# Patient Record
Sex: MALE | Race: BLACK OR AFRICAN AMERICAN | NOT HISPANIC OR LATINO
[De-identification: names, ages, dates, MRNs, and addresses within clinical notes are randomized per-mention and may not be internally consistent; named-entity substitution may affect disease eponyms.]

---

## 2018-09-21 ENCOUNTER — APPOINTMENT (OUTPATIENT)
Dept: ORTHOPEDIC SURGERY | Facility: CLINIC | Age: 63
End: 2018-09-21

## 2021-03-31 ENCOUNTER — APPOINTMENT (OUTPATIENT)
Dept: HEART AND VASCULAR | Facility: CLINIC | Age: 66
End: 2021-03-31
Payer: MEDICARE

## 2021-03-31 VITALS
OXYGEN SATURATION: 98 % | HEIGHT: 68 IN | BODY MASS INDEX: 25.46 KG/M2 | WEIGHT: 168 LBS | HEART RATE: 90 BPM | TEMPERATURE: 97.6 F

## 2021-03-31 VITALS — DIASTOLIC BLOOD PRESSURE: 90 MMHG | SYSTOLIC BLOOD PRESSURE: 170 MMHG

## 2021-03-31 DIAGNOSIS — R60.0 LOCALIZED EDEMA: ICD-10-CM

## 2021-03-31 DIAGNOSIS — K21.9 GASTRO-ESOPHAGEAL REFLUX DISEASE W/OUT ESOPHAGITIS: ICD-10-CM

## 2021-03-31 PROCEDURE — 99205 OFFICE O/P NEW HI 60 MIN: CPT

## 2021-03-31 PROCEDURE — 93000 ELECTROCARDIOGRAM COMPLETE: CPT

## 2021-03-31 PROCEDURE — 93306 TTE W/DOPPLER COMPLETE: CPT

## 2021-03-31 NOTE — PHYSICAL EXAM
[General Appearance - Well Developed] : well developed [Normal Appearance] : normal appearance [Well Groomed] : well groomed [General Appearance - Well Nourished] : well nourished [No Deformities] : no deformities [General Appearance - In No Acute Distress] : no acute distress [Normal Conjunctiva] : the conjunctiva exhibited no abnormalities [Eyelids - No Xanthelasma] : the eyelids demonstrated no xanthelasmas [Normal Oral Mucosa] : normal oral mucosa [No Oral Pallor] : no oral pallor [No Oral Cyanosis] : no oral cyanosis [Normal Jugular Venous A Waves Present] : normal jugular venous A waves present [Normal Jugular Venous V Waves Present] : normal jugular venous V waves present [No Jugular Venous Broussard A Waves] : no jugular venous broussard A waves [Heart Sounds] : normal S1 and S2 [Heart Rate And Rhythm] : heart rate and rhythm were normal [Murmurs] : no murmurs present [Respiration, Rhythm And Depth] : normal respiratory rhythm and effort [Exaggerated Use Of Accessory Muscles For Inspiration] : no accessory muscle use [Auscultation Breath Sounds / Voice Sounds] : lungs were clear to auscultation bilaterally [Abdomen Soft] : soft [Abdomen Tenderness] : non-tender [Abdomen Mass (___ Cm)] : no abdominal mass palpated [Abnormal Walk] : normal gait [Gait - Sufficient For Exercise Testing] : the gait was sufficient for exercise testing [Cyanosis, Localized] : no localized cyanosis [Nail Clubbing] : no clubbing of the fingernails [Petechial Hemorrhages (___cm)] : no petechial hemorrhages [Skin Color & Pigmentation] : normal skin color and pigmentation [] : no rash [No Venous Stasis] : no venous stasis [Skin Lesions] : no skin lesions [No Skin Ulcers] : no skin ulcer [No Xanthoma] : no  xanthoma was observed [Oriented To Time, Place, And Person] : oriented to person, place, and time [Affect] : the affect was normal [Mood] : the mood was normal [No Anxiety] : not feeling anxious

## 2021-03-31 NOTE — REVIEW OF SYSTEMS
[Shortness Of Breath] : shortness of breath [Chest Pain] : chest pain [Lower Ext Edema] : lower extremity edema [Negative] : Heme/Lymph

## 2021-04-01 ENCOUNTER — INPATIENT (INPATIENT)
Facility: HOSPITAL | Age: 66
LOS: 1 days | Discharge: HOME | DRG: 287 | End: 2021-04-03
Attending: INTERNAL MEDICINE | Admitting: INTERNAL MEDICINE
Payer: MEDICARE

## 2021-04-01 VITALS
SYSTOLIC BLOOD PRESSURE: 159 MMHG | HEART RATE: 83 BPM | WEIGHT: 167.99 LBS | RESPIRATION RATE: 20 BRPM | DIASTOLIC BLOOD PRESSURE: 97 MMHG | OXYGEN SATURATION: 97 % | TEMPERATURE: 98 F

## 2021-04-01 DIAGNOSIS — I50.21 ACUTE SYSTOLIC (CONGESTIVE) HEART FAILURE: ICD-10-CM

## 2021-04-01 DIAGNOSIS — K21.9 GASTRO-ESOPHAGEAL REFLUX DISEASE WITHOUT ESOPHAGITIS: ICD-10-CM

## 2021-04-01 DIAGNOSIS — I10 ESSENTIAL (PRIMARY) HYPERTENSION: ICD-10-CM

## 2021-04-01 DIAGNOSIS — R73.03 PREDIABETES: ICD-10-CM

## 2021-04-01 LAB
A1C WITH ESTIMATED AVERAGE GLUCOSE RESULT: 5.9 % — HIGH (ref 4–5.6)
BASOPHILS # BLD AUTO: 0.07 K/UL — SIGNIFICANT CHANGE UP (ref 0–0.2)
BASOPHILS NFR BLD AUTO: 1.4 % — SIGNIFICANT CHANGE UP (ref 0–2)
CK MB CFR SERPL CALC: 6.4 NG/ML — SIGNIFICANT CHANGE UP (ref 0–6.7)
CK MB CFR SERPL CALC: 7.4 NG/ML — HIGH (ref 0–6.7)
CK SERPL-CCNC: 184 U/L — SIGNIFICANT CHANGE UP (ref 30–200)
CK SERPL-CCNC: 217 U/L — HIGH (ref 30–200)
EOSINOPHIL # BLD AUTO: 0.06 K/UL — SIGNIFICANT CHANGE UP (ref 0–0.5)
EOSINOPHIL NFR BLD AUTO: 1.2 % — SIGNIFICANT CHANGE UP (ref 0–6)
ESTIMATED AVERAGE GLUCOSE: 123 MG/DL — HIGH (ref 68–114)
HCT VFR BLD CALC: 41 % — SIGNIFICANT CHANGE UP (ref 39–50)
HGB BLD-MCNC: 13.1 G/DL — SIGNIFICANT CHANGE UP (ref 13–17)
IMM GRANULOCYTES NFR BLD AUTO: 0.2 % — SIGNIFICANT CHANGE UP (ref 0–1.5)
LYMPHOCYTES # BLD AUTO: 1.63 K/UL — SIGNIFICANT CHANGE UP (ref 1–3.3)
LYMPHOCYTES # BLD AUTO: 31.8 % — SIGNIFICANT CHANGE UP (ref 13–44)
MCHC RBC-ENTMCNC: 26.7 PG — LOW (ref 27–34)
MCHC RBC-ENTMCNC: 32 GM/DL — SIGNIFICANT CHANGE UP (ref 32–36)
MCV RBC AUTO: 83.5 FL — SIGNIFICANT CHANGE UP (ref 80–100)
MONOCYTES # BLD AUTO: 0.72 K/UL — SIGNIFICANT CHANGE UP (ref 0–0.9)
MONOCYTES NFR BLD AUTO: 14 % — SIGNIFICANT CHANGE UP (ref 2–14)
NEUTROPHILS # BLD AUTO: 2.64 K/UL — SIGNIFICANT CHANGE UP (ref 1.8–7.4)
NEUTROPHILS NFR BLD AUTO: 51.4 % — SIGNIFICANT CHANGE UP (ref 43–77)
NRBC # BLD: 0 /100 WBCS — SIGNIFICANT CHANGE UP (ref 0–0)
PLATELET # BLD AUTO: 371 K/UL — SIGNIFICANT CHANGE UP (ref 150–400)
RBC # BLD: 4.91 M/UL — SIGNIFICANT CHANGE UP (ref 4.2–5.8)
RBC # FLD: 14.6 % — HIGH (ref 10.3–14.5)
TROPONIN T SERPL-MCNC: 0.02 NG/ML — HIGH (ref 0–0.01)
WBC # BLD: 5.13 K/UL — SIGNIFICANT CHANGE UP (ref 3.8–10.5)
WBC # FLD AUTO: 5.13 K/UL — SIGNIFICANT CHANGE UP (ref 3.8–10.5)

## 2021-04-01 PROCEDURE — 93010 ELECTROCARDIOGRAM REPORT: CPT

## 2021-04-01 PROCEDURE — 93460 R&L HRT ART/VENTRICLE ANGIO: CPT | Mod: 26

## 2021-04-01 PROCEDURE — 71045 X-RAY EXAM CHEST 1 VIEW: CPT | Mod: 26

## 2021-04-01 PROCEDURE — 93306 TTE W/DOPPLER COMPLETE: CPT | Mod: 26

## 2021-04-01 PROCEDURE — ZZZZZ: CPT

## 2021-04-01 PROCEDURE — 99285 EMERGENCY DEPT VISIT HI MDM: CPT | Mod: CS

## 2021-04-01 PROCEDURE — 99152 MOD SED SAME PHYS/QHP 5/>YRS: CPT

## 2021-04-01 RX ORDER — POTASSIUM CHLORIDE 20 MEQ
40 PACKET (EA) ORAL EVERY 4 HOURS
Refills: 0 | Status: COMPLETED | OUTPATIENT
Start: 2021-04-01 | End: 2021-04-01

## 2021-04-01 RX ORDER — ASPIRIN/CALCIUM CARB/MAGNESIUM 324 MG
81 TABLET ORAL DAILY
Refills: 0 | Status: DISCONTINUED | OUTPATIENT
Start: 2021-04-02 | End: 2021-04-03

## 2021-04-01 RX ORDER — SACUBITRIL AND VALSARTAN 24; 26 MG/1; MG/1
1 TABLET, FILM COATED ORAL
Refills: 0 | Status: DISCONTINUED | OUTPATIENT
Start: 2021-04-02 | End: 2021-04-03

## 2021-04-01 RX ORDER — PANTOPRAZOLE SODIUM 20 MG/1
40 TABLET, DELAYED RELEASE ORAL
Refills: 0 | Status: DISCONTINUED | OUTPATIENT
Start: 2021-04-01 | End: 2021-04-03

## 2021-04-01 RX ORDER — LOSARTAN POTASSIUM 100 MG/1
100 TABLET, FILM COATED ORAL DAILY
Refills: 0 | Status: DISCONTINUED | OUTPATIENT
Start: 2021-04-01 | End: 2021-04-01

## 2021-04-01 RX ORDER — FUROSEMIDE 40 MG
40 TABLET ORAL ONCE
Refills: 0 | Status: COMPLETED | OUTPATIENT
Start: 2021-04-01 | End: 2021-04-01

## 2021-04-01 RX ORDER — FUROSEMIDE 40 MG
40 TABLET ORAL
Refills: 0 | Status: DISCONTINUED | OUTPATIENT
Start: 2021-04-01 | End: 2021-04-03

## 2021-04-01 RX ORDER — CLOPIDOGREL BISULFATE 75 MG/1
600 TABLET, FILM COATED ORAL ONCE
Refills: 0 | Status: COMPLETED | OUTPATIENT
Start: 2021-04-01 | End: 2021-04-01

## 2021-04-01 RX ORDER — ASPIRIN/CALCIUM CARB/MAGNESIUM 324 MG
325 TABLET ORAL ONCE
Refills: 0 | Status: COMPLETED | OUTPATIENT
Start: 2021-04-01 | End: 2021-04-01

## 2021-04-01 RX ORDER — INFLUENZA VIRUS VACCINE 15; 15; 15; 15 UG/.5ML; UG/.5ML; UG/.5ML; UG/.5ML
0.7 SUSPENSION INTRAMUSCULAR ONCE
Refills: 0 | Status: DISCONTINUED | OUTPATIENT
Start: 2021-04-01 | End: 2021-04-03

## 2021-04-01 RX ORDER — ASPIRIN/CALCIUM CARB/MAGNESIUM 324 MG
325 TABLET ORAL DAILY
Refills: 0 | Status: DISCONTINUED | OUTPATIENT
Start: 2021-04-01 | End: 2021-04-01

## 2021-04-01 RX ORDER — LANSOPRAZOLE 15 MG/1
1 CAPSULE, DELAYED RELEASE ORAL
Qty: 0 | Refills: 0 | DISCHARGE

## 2021-04-01 RX ORDER — INFLUENZA VIRUS VACCINE 15; 15; 15; 15 UG/.5ML; UG/.5ML; UG/.5ML; UG/.5ML
0.5 SUSPENSION INTRAMUSCULAR ONCE
Refills: 0 | Status: DISCONTINUED | OUTPATIENT
Start: 2021-04-01 | End: 2021-04-01

## 2021-04-01 RX ADMIN — Medication 40 MILLIEQUIVALENT(S): at 09:53

## 2021-04-01 RX ADMIN — Medication 40 MILLIEQUIVALENT(S): at 07:35

## 2021-04-01 RX ADMIN — Medication 325 MILLIGRAM(S): at 09:53

## 2021-04-01 RX ADMIN — CLOPIDOGREL BISULFATE 600 MILLIGRAM(S): 75 TABLET, FILM COATED ORAL at 09:53

## 2021-04-01 RX ADMIN — Medication 40 MILLIGRAM(S): at 23:32

## 2021-04-01 RX ADMIN — Medication 40 MILLIGRAM(S): at 07:33

## 2021-04-01 NOTE — H&P ADULT - PROBLEM SELECTOR PLAN 4
C/w Pantoprazole 40mg QD    VTE: Holding for cath  Dispo: Anticipate discharge in 24/48 hrs pending clinical progression w/ no needs A1c 5.9, denies h/o DM or Pre-DM  - Nutrition consulted

## 2021-04-01 NOTE — H&P ADULT - NS MD HP PULSE DORSALIS
"Chief Complaint   Patient presents with     Back Pain     Panel Management     Tdap       Initial /76 (BP Location: Left arm, Patient Position: Chair, Cuff Size: Adult Regular)  Pulse 78  Temp 98.3  F (36.8  C) (Oral)  Resp 16  Wt 210 lb (95.3 kg)  BMI 29.4 kg/m2 Estimated body mass index is 29.4 kg/(m^2) as calculated from the following:    Height as of 3/14/18: 5' 10.87\" (1.8 m).    Weight as of this encounter: 210 lb (95.3 kg).  Medication Reconciliation: complete    "
2+ b/l

## 2021-04-01 NOTE — ED PROVIDER NOTE - OBJECTIVE STATEMENT
66 yo m with PMHx HTN c/o exertional dyspnea.  Has been having mild NIELSEN over past few months, but in the past week his exertional dyspnea has worsened, and now has to stop to rest after walking 1 block.  Started having swelling bilaterally in ankles in past week.  Saw Dr Solorzano yesterday, had echo with EF 15%. Has new orthopnea and occasionally waking up short of breath.  Gets some mild discomfort in the chest, but pain-free now.    PMHx HTN, BPH  PSHx TURP  Meds: HCTZ, Benicar, lanzoprazole, Viagra  NKDA  Social: former smoker, no cocaine use  Family: no hx of CAD or sudden death in first deg relatives

## 2021-04-01 NOTE — ED PROVIDER NOTE - NS ED ROS FT
CONSTITUTIONAL: No fever, chills, or weakness  NEURO: No headache, no dizziness, no syncope; No focal weakness/tingling/numbness  EYES: No visual changes  ENT: No rhinorrhea or sore throat  PULM: NIELSEN, orthopnea.  No cough or hemoptysis  CV: HPI  GI: No abdominal pain, vomiting, or diarrhea  : No dysuria, hematuria, frequency  MSK: No neck pain or back pain, no joint pain  SKIN: no rash or unusual bruising

## 2021-04-01 NOTE — H&P ADULT - HISTORY OF PRESENT ILLNESS
64 y/o Male former smoker, with PMHx of HTN, GERD, and h/o TURP who presented to [facility] ED on 4/1/21 from Dr Solorzano's office c/o progressively worsening NIELSEN after block, w/ associated chest tightness, after walking 1 block, over the last 2 months, but particularly worse over the last week. Pt also c/o orthopnea, PND, LE edema over the last week. Pt reportedly was active without issues prior to these last couple of months. He reports never having Covid, but did get the J/J Covid vaccine last month. Pt denies fevers, chills, cough, HA, dizziness, syncope, palpitations, n/v/d, melena, BRBPR, dysuria, recent travel, sick contacts, or any other symptoms at this time. Dr. Solorzano performed Echo on 3/31/21 revealing EF 15%, severe global hypokinesis w/ normal contraction at the basal anterior, basal posterolateral and the basal anterolateral LV walls, dilated/hypertrophic LV, LV diastolic dysfunction, RV systolic dysfunction, massively enlarged LA, severely enlarged RA, moderate AR, moderate to severe MR (multiple jets with main jet impinging posterior LA wall), mild-moderate FL/TR, RVSP 42mmHg, small pericardial effusion. Pt was subsequently referred to ED by Dr Solorzaon for cardiac cath as part of workup of systolic CHF.    On arrival to ED, VS noted as T 97.5, HR 83, /97, RR 20, SpO2 97% RA. Labs significant for Trop 0.02, BNP 8311, K 3.3, BUN/Cr 21/1.27, GFR 58. CXR wet read revealed pulmonary congestion per ED attending (f/u official report). EKG revealed SR @ 82BPM, 1st degree AV block, LAFB, Q waves in V6, I, aVL and TWI in V6, aVL. In ED, pt given Lasix 40mg IV x 1 and is now admitted to cardiac telemetry for further workup and management of acute systolic CHF.   64 y/o Male, former smoker, with PMHx of HTN, GERD, and h/o TURP who presented to [facility] ED on 4/1/21 from Dr Solorzano's office c/o progressively worsening NIELSEN after block, w/ associated chest tightness, after walking 1 block, over the last 2 months, but particularly worse over the last week. Pt also c/o orthopnea, PND, LE edema over the last week. Pt reportedly was active without issues prior to these last couple of months. He reports never having Covid, but did get the J/J Covid vaccine last month. Pt denies fevers, chills, cough, HA, dizziness, syncope, palpitations, n/v/d, melena, BRBPR, dysuria, recent travel, sick contacts, or any other symptoms at this time. Dr. Solorzano performed Echo on 3/31/21 revealing EF 15%, severe global hypokinesis w/ normal contraction at the basal anterior, basal posterolateral and the basal anterolateral LV walls, dilated/hypertrophic LV, LV diastolic dysfunction, RV systolic dysfunction, massively enlarged LA, severely enlarged RA, moderate AR, moderate to severe MR (multiple jets with main jet impinging posterior LA wall), mild-moderate FL/TR, RVSP 42mmHg, small pericardial effusion. Pt was subsequently referred to ED by Dr Solorzano for cardiac cath as part of workup of systolic CHF.    On arrival to ED, VS noted as T 97.5, HR 83, /97, RR 20, SpO2 97% RA. Labs significant for Trop 0.02, BNP 8311, K 3.3, BUN/Cr 21/1.27, GFR 67. CXR wet read revealed pulmonary congestion per ED attending (f/u official report). EKG revealed SR @ 82BPM, 1st degree AV block, LAFB, Q waves in V6, I, aVL and TWI in V6, aVL. In ED, pt given Lasix 40mg IV x 1 and is now admitted to cardiac telemetry for further workup and management of acute systolic CHF.

## 2021-04-01 NOTE — ED ADULT NURSE NOTE - OBJECTIVE STATEMENT
received A&OX3 65years old male with c/o of " I have shortness of breathe when I walk." pt went to the cardiologist yesterday and said the doctor told him to come to the emergency room for cath. pt denies chest pain, dizziness, radiating pain, coughing. pt reports SOB when he lays down or moves his body too much. currently, lung sounds clear to auscultate, EKG doesn't show STEMI. pt connected to cardiac monitor. IV access established. no fever, n/v/d, back pain, chills, diaphoresis, syncope. received A&OX3 65years old male with c/o of " I have shortness of breathe when I walk." pt went to the cardiologist yesterday and said the doctor told him to come to the emergency room for cath. pt denies chest pain, dizziness, radiating pain, coughing. pt reports SOB when he lays down or moves his body too much. BLE foot swelling. currently, lung sounds clear to auscultate, EKG doesn't show STEMI. pt connected to cardiac monitor. IV access established. no fever, n/v/d, back pain, chills, diaphoresis, syncope.

## 2021-04-01 NOTE — H&P ADULT - PROBLEM SELECTOR PLAN 3
C/w Pantoprazole 40mg QD    VTE: Holding for cath  Dispo: Anticipate discharge in 24/48 hrs pending clinical progression w/ no needs BPs ~150s/90s on arrival  - C/w Losartan 100mg QD. Will start BB after cath pending volume status BPs ~150s/90s on arrival  - Holding ARB as above and consider switch to Entresto post cath. Will start BB after cath pending volume status

## 2021-04-01 NOTE — ED PROVIDER NOTE - ATTENDING CONTRIBUTION TO CARE
65M hx htn, c/o SOB.  pt states worse with exertion. worsening over past week, +LE swelling. pt states saw Dr. Solorzano yesterday and had echo showing EF15%.  some chest discomfort with exertion. Dr. Solorzano had recommended he come to the ED for admission and likely cath  gen- nad  heent- ncat, clear conj  cv -rrr  lungs -ctab  abd - soft, nt, nd  ext -wwp, 1+edema b/l  neuro -aox3, steady gait, cotto  cardiomegaly on CT, labs sent, elevated trop and pro-bnp. admitted to cardiology

## 2021-04-01 NOTE — H&P ADULT - ASSESSMENT
64 y/o Male former smoker, with PMHx of HTN, GERD, and h/o TURP who presented to [facility] ED on 4/1/21 from Dr Solorzano's office c/o progressively worsening NIELSEN after block, w/ associated chest tightness, after walking 1 block, over the last 2 months, but particularly worse over the last week. Pt also c/o orthopnea, PND, LE edema over the last week. Pt reportedly was active without issues prior to these last couple of months. Pt is now admitted to cardiac telemetry for further workup and management of acute systolic CHF (EF 15% by outpt Echo on 3/31/21 in [employer]) with plan for cardiac cath to r/o ischemic cardiomyopathy.

## 2021-04-01 NOTE — H&P ADULT - NSHPLABSRESULTS_GEN_ALL_CORE
13.1   5.13  )-----------( 371      ( 01 Apr 2021 06:25 )             41.0       PT/INR - ( 01 Apr 2021 06:25 )   PT: 15.8 sec;   INR: 1.33          PTT - ( 01 Apr 2021 06:25 )  PTT:32.7 sec    04-01    142  |  105  |  21  ----------------------------<  102<H>  3.3<L>   |  26  |  1.27    Ca    8.9      01 Apr 2021 06:25    TPro  6.3  /  Alb  3.6  /  TBili  1.7<H>  /  DBili  x   /  AST  32  /  ALT  43  /  AlkPhos  91  04-01      CARDIAC MARKERS ( 01 Apr 2021 06:25 )  x     / 0.02 ng/mL / 217 U/L / x     / 7.4 ng/mL

## 2021-04-01 NOTE — DISCUSSION/SUMMARY
[FreeTextEntry1] : new chf w CM by echo\par he will go to ER for initiation of CHF treatment and cath

## 2021-04-01 NOTE — H&P ADULT - PROBLEM SELECTOR PLAN 2
BPs ~150s/90s on arrival  - C/w Losartan 100mg QD. Will start BB after cath pending volume status BUN/Cr 21/1.27, GFR 67 on admission. Pt denies h/o CKD, but likely component of CKD vs ISHMAEL 2/2 CHF  - UA ordered, f/u results  - K 3.3 on admission, ordered for KCl 40mEq PO x 2. F/u repeat BMP at 8PM  - Continue to monitor

## 2021-04-01 NOTE — H&P ADULT - PROBLEM SELECTOR PLAN 1
Currently mildly overloaded on exam, +JVD, bibasilar crackles, trace-1+ pitting edema b/l, BNP 8K, CXR wet read pulmonary congestion (f/u official report)  - Cardiac enzymes mildly elevated, possibly 2/2 CHF->f/u enzymes at 12PM  - EF 15% by outpt Echo in [employer]. Echo ordered, f/u results  - s/p Lasix 40mg IV x 1 in ED. Will f/u results of cath and order diuresis based on findings. C/w Losartan 100mg QD (TIC for Olmesartan 40mg QD). Will start BB after cath pending volume status  - Plan for R+L heart cath today with Dr Trinh. Consent obtained and in chart  - Loaded with ASA 325mg PO x 1 and Plavix 600mg PO x 1 on 4/1/21  - Core measures, strict I/Os, daily weights, 1L fluid restriction Currently mildly overloaded on exam, +JVD, bibasilar crackles, trace-1+ pitting edema b/l, BNP 8K, CXR wet read pulmonary congestion (f/u official report)  - Cardiac enzymes mildly elevated, possibly 2/2 CHF->f/u enzymes at 12PM  - EF 15% by outpt Echo in [employer]. Echo ordered, f/u results  - s/p Lasix 40mg IV x 1 in ED. Will f/u results of cath and order diuresis based on findings. Holding ARB in light of pending cath and possible switch to Entresto post cath given severely reduced EF. Will start BB after cath pending volume status  - Plan for R+L heart cath today with Dr Trinh. Consent obtained and in chart  - Loaded with ASA 325mg PO x 1 and Plavix 600mg PO x 1 on 4/1/21  - Core measures, strict I/Os, daily weights, 1L fluid restriction

## 2021-04-01 NOTE — ED PROVIDER NOTE - CLINICAL SUMMARY MEDICAL DECISION MAKING FREE TEXT BOX
Subacute HD symptoms, now with accelerated symptoms in past week.  LVEF 15% yesterday in the office.  To be admitted for cath.

## 2021-04-01 NOTE — H&P ADULT - PROBLEM SELECTOR PLAN 5
C/w Pantoprazole 40mg QD    VTE: Holding for cath  Dispo: Anticipate discharge in 24/48 hrs pending clinical progression w/ no needs

## 2021-04-01 NOTE — HISTORY OF PRESENT ILLNESS
[FreeTextEntry1] : worsening dyspnea in the last 2 months but acutely worse in last week\par some chest tightness\par +PND and also + edema in last few weeks\par up until 6 months ago he felt fine\par last seen by cardio in 2018- Dr Quezada- told wnl\par didn’t have covid and got J/J vaccine last month\par

## 2021-04-01 NOTE — ED PROVIDER NOTE - NS ED MD TWO NIGHTS YN
Eber Brito is a 30 y.o. male.     History of Present Illness Complaining of 5 day history of poison ivy on face and left arm.  Has had it in the past. Requesting an injection. He got it from cleaning a fence row.    The following portions of the patient's history were reviewed and updated as appropriate: allergies, current medications, past family history, past medical history, past social history, past surgical history and problem list.    Review of Systems   Constitutional: Negative for fatigue, fever and unexpected weight change.   HENT: Negative for congestion, hearing loss, nosebleeds, rhinorrhea, sore throat, trouble swallowing and voice change.    Eyes: Negative for pain, discharge, redness and visual disturbance.   Respiratory: Negative for cough, chest tightness, shortness of breath and wheezing.    Cardiovascular: Negative for chest pain, palpitations and leg swelling.   Gastrointestinal: Negative for abdominal distention, abdominal pain, anal bleeding, blood in stool, constipation, diarrhea, nausea and vomiting.   Endocrine: Negative for cold intolerance, heat intolerance, polydipsia, polyphagia and polyuria.   Genitourinary: Negative for dysuria, flank pain, frequency and hematuria.   Musculoskeletal: Negative for arthralgias, gait problem, joint swelling and myalgias.   Skin: Positive for rash. Negative for color change.   Neurological: Negative for dizziness, tremors, seizures, syncope, speech difficulty, weakness, numbness and headaches.   Hematological: Negative.    Psychiatric/Behavioral: Negative.        Objective   Physical Exam   Constitutional: He is oriented to person, place, and time. He appears well-developed and well-nourished. No distress.   HENT:   Head: Normocephalic and atraumatic.   Right Ear: External ear normal.   Left Ear: External ear normal.   Nose: Nose normal.   Eyes: Conjunctivae are normal. Pupils are equal, round, and reactive to light. Right eye exhibits no  discharge. Left eye exhibits no discharge. No scleral icterus.   Neck: Neck supple.   Cardiovascular: Normal rate and regular rhythm.    Pulmonary/Chest: Effort normal.   Musculoskeletal: He exhibits no edema or deformity.   Neurological: He is alert and oriented to person, place, and time. He exhibits normal muscle tone. Coordination normal.   Skin: Skin is warm and dry. Rash noted.   Under left eye and left cheek is erythematous based vesicles in a line.  Left forearm with same lesions   Psychiatric: He has a normal mood and affect. His behavior is normal. Judgment and thought content normal.   Nursing note and vitals reviewed.      Assessment/Plan   Josh was seen today for poison ivy.    Diagnoses and all orders for this visit:    Poison ivy dermatitis  -     methylPREDNISolone acetate (DEPO-medrol) injection 40 mg; Inject 1 mL into the shoulder, thigh, or buttocks 1 (One) Time.    Discussed precautions to prevent re-exposure. Follow up symptoms persist or worsen.              Yes

## 2021-04-02 ENCOUNTER — TRANSCRIPTION ENCOUNTER (OUTPATIENT)
Age: 66
End: 2021-04-02

## 2021-04-02 DIAGNOSIS — E87.6 HYPOKALEMIA: ICD-10-CM

## 2021-04-02 DIAGNOSIS — Z29.9 ENCOUNTER FOR PROPHYLACTIC MEASURES, UNSPECIFIED: ICD-10-CM

## 2021-04-02 DIAGNOSIS — I50.41 ACUTE COMBINED SYSTOLIC (CONGESTIVE) AND DIASTOLIC (CONGESTIVE) HEART FAILURE: ICD-10-CM

## 2021-04-02 LAB
ALBUMIN SERPL ELPH-MCNC: 4 G/DL — SIGNIFICANT CHANGE UP (ref 3.3–5)
ALP SERPL-CCNC: 107 U/L — SIGNIFICANT CHANGE UP (ref 40–120)
ALT FLD-CCNC: 43 U/L — SIGNIFICANT CHANGE UP (ref 10–45)
ANION GAP SERPL CALC-SCNC: 14 MMOL/L — SIGNIFICANT CHANGE UP (ref 5–17)
AST SERPL-CCNC: 29 U/L — SIGNIFICANT CHANGE UP (ref 10–40)
BASOPHILS # BLD AUTO: 0.09 K/UL — SIGNIFICANT CHANGE UP (ref 0–0.2)
BASOPHILS NFR BLD AUTO: 1.5 % — SIGNIFICANT CHANGE UP (ref 0–2)
BILIRUB SERPL-MCNC: 2.2 MG/DL — HIGH (ref 0.2–1.2)
BUN SERPL-MCNC: 20 MG/DL — SIGNIFICANT CHANGE UP (ref 7–23)
CALCIUM SERPL-MCNC: 9.7 MG/DL — SIGNIFICANT CHANGE UP (ref 8.4–10.5)
CHLORIDE SERPL-SCNC: 103 MMOL/L — SIGNIFICANT CHANGE UP (ref 96–108)
CHOLEST SERPL-MCNC: 174 MG/DL — SIGNIFICANT CHANGE UP
CO2 SERPL-SCNC: 28 MMOL/L — SIGNIFICANT CHANGE UP (ref 22–31)
COVID-19 SPIKE DOMAIN AB INTERP: POSITIVE
COVID-19 SPIKE DOMAIN ANTIBODY RESULT: 3.9 U/ML — HIGH
CREAT SERPL-MCNC: 1.25 MG/DL — SIGNIFICANT CHANGE UP (ref 0.5–1.3)
CRP SERPL-MCNC: 6.1 MG/L — HIGH (ref 0–4)
EOSINOPHIL # BLD AUTO: 0.07 K/UL — SIGNIFICANT CHANGE UP (ref 0–0.5)
EOSINOPHIL NFR BLD AUTO: 1.1 % — SIGNIFICANT CHANGE UP (ref 0–6)
ERYTHROCYTE [SEDIMENTATION RATE] IN BLOOD: 5 MM/HR — SIGNIFICANT CHANGE UP
GLUCOSE SERPL-MCNC: 94 MG/DL — SIGNIFICANT CHANGE UP (ref 70–99)
HCT VFR BLD CALC: 48.1 % — SIGNIFICANT CHANGE UP (ref 39–50)
HCV AB S/CO SERPL IA: 0.06 S/CO — SIGNIFICANT CHANGE UP
HCV AB SERPL-IMP: SIGNIFICANT CHANGE UP
HDLC SERPL-MCNC: 42 MG/DL — SIGNIFICANT CHANGE UP
HGB BLD-MCNC: 14.9 G/DL — SIGNIFICANT CHANGE UP (ref 13–17)
IMM GRANULOCYTES NFR BLD AUTO: 0.2 % — SIGNIFICANT CHANGE UP (ref 0–1.5)
LIPID PNL WITH DIRECT LDL SERPL: 117 MG/DL — HIGH
LYMPHOCYTES # BLD AUTO: 1.75 K/UL — SIGNIFICANT CHANGE UP (ref 1–3.3)
LYMPHOCYTES # BLD AUTO: 28.5 % — SIGNIFICANT CHANGE UP (ref 13–44)
MAGNESIUM SERPL-MCNC: 2 MG/DL — SIGNIFICANT CHANGE UP (ref 1.6–2.6)
MCHC RBC-ENTMCNC: 26.7 PG — LOW (ref 27–34)
MCHC RBC-ENTMCNC: 31 GM/DL — LOW (ref 32–36)
MCV RBC AUTO: 86.2 FL — SIGNIFICANT CHANGE UP (ref 80–100)
MONOCYTES # BLD AUTO: 0.66 K/UL — SIGNIFICANT CHANGE UP (ref 0–0.9)
MONOCYTES NFR BLD AUTO: 10.8 % — SIGNIFICANT CHANGE UP (ref 2–14)
NEUTROPHILS # BLD AUTO: 3.55 K/UL — SIGNIFICANT CHANGE UP (ref 1.8–7.4)
NEUTROPHILS NFR BLD AUTO: 57.9 % — SIGNIFICANT CHANGE UP (ref 43–77)
NON HDL CHOLESTEROL: 132 MG/DL — HIGH
NRBC # BLD: 0 /100 WBCS — SIGNIFICANT CHANGE UP (ref 0–0)
PLATELET # BLD AUTO: 437 K/UL — HIGH (ref 150–400)
POTASSIUM SERPL-MCNC: 3.4 MMOL/L — LOW (ref 3.5–5.3)
POTASSIUM SERPL-SCNC: 3.4 MMOL/L — LOW (ref 3.5–5.3)
PROT SERPL-MCNC: 7.5 G/DL — SIGNIFICANT CHANGE UP (ref 6–8.3)
RBC # BLD: 5.58 M/UL — SIGNIFICANT CHANGE UP (ref 4.2–5.8)
RBC # FLD: 14.8 % — HIGH (ref 10.3–14.5)
SARS-COV-2 IGG+IGM SERPL QL IA: 3.9 U/ML — HIGH
SARS-COV-2 IGG+IGM SERPL QL IA: POSITIVE
SODIUM SERPL-SCNC: 145 MMOL/L — SIGNIFICANT CHANGE UP (ref 135–145)
TRIGL SERPL-MCNC: 74 MG/DL — SIGNIFICANT CHANGE UP
WBC # BLD: 6.13 K/UL — SIGNIFICANT CHANGE UP (ref 3.8–10.5)
WBC # FLD AUTO: 6.13 K/UL — SIGNIFICANT CHANGE UP (ref 3.8–10.5)

## 2021-04-02 PROCEDURE — 99232 SBSQ HOSP IP/OBS MODERATE 35: CPT

## 2021-04-02 PROCEDURE — 99233 SBSQ HOSP IP/OBS HIGH 50: CPT

## 2021-04-02 PROCEDURE — 71250 CT THORAX DX C-: CPT | Mod: 26

## 2021-04-02 RX ORDER — POTASSIUM CHLORIDE 20 MEQ
20 PACKET (EA) ORAL ONCE
Refills: 0 | Status: COMPLETED | OUTPATIENT
Start: 2021-04-02 | End: 2021-04-02

## 2021-04-02 RX ORDER — CARVEDILOL PHOSPHATE 80 MG/1
12.5 CAPSULE, EXTENDED RELEASE ORAL EVERY 12 HOURS
Refills: 0 | Status: DISCONTINUED | OUTPATIENT
Start: 2021-04-02 | End: 2021-04-03

## 2021-04-02 RX ORDER — POTASSIUM CHLORIDE 20 MEQ
40 PACKET (EA) ORAL ONCE
Refills: 0 | Status: COMPLETED | OUTPATIENT
Start: 2021-04-02 | End: 2021-04-02

## 2021-04-02 RX ORDER — ENOXAPARIN SODIUM 100 MG/ML
40 INJECTION SUBCUTANEOUS EVERY 24 HOURS
Refills: 0 | Status: DISCONTINUED | OUTPATIENT
Start: 2021-04-02 | End: 2021-04-03

## 2021-04-02 RX ORDER — POTASSIUM CHLORIDE 20 MEQ
20 PACKET (EA) ORAL ONCE
Refills: 0 | Status: DISCONTINUED | OUTPATIENT
Start: 2021-04-02 | End: 2021-04-02

## 2021-04-02 RX ORDER — POTASSIUM CHLORIDE 20 MEQ
40 PACKET (EA) ORAL ONCE
Refills: 0 | Status: DISCONTINUED | OUTPATIENT
Start: 2021-04-02 | End: 2021-04-02

## 2021-04-02 RX ADMIN — Medication 40 MILLIEQUIVALENT(S): at 20:53

## 2021-04-02 RX ADMIN — Medication 20 MILLIEQUIVALENT(S): at 22:10

## 2021-04-02 RX ADMIN — CARVEDILOL PHOSPHATE 12.5 MILLIGRAM(S): 80 CAPSULE, EXTENDED RELEASE ORAL at 17:13

## 2021-04-02 RX ADMIN — PANTOPRAZOLE SODIUM 40 MILLIGRAM(S): 20 TABLET, DELAYED RELEASE ORAL at 06:54

## 2021-04-02 RX ADMIN — Medication 40 MILLIGRAM(S): at 10:48

## 2021-04-02 RX ADMIN — SACUBITRIL AND VALSARTAN 1 TABLET(S): 24; 26 TABLET, FILM COATED ORAL at 17:13

## 2021-04-02 RX ADMIN — Medication 81 MILLIGRAM(S): at 10:49

## 2021-04-02 RX ADMIN — ENOXAPARIN SODIUM 40 MILLIGRAM(S): 100 INJECTION SUBCUTANEOUS at 22:11

## 2021-04-02 RX ADMIN — Medication 40 MILLIGRAM(S): at 22:11

## 2021-04-02 NOTE — PROGRESS NOTE ADULT - PROBLEM SELECTOR PLAN 1
Currently mildly overloaded on exam, +JVD, bibasilar crackles, trace-1+ pitting edema b/l, BNP 8K, CXR wet read pulmonary congestion (f/u official report)  - Cardiac enzymes mildly elevated, possibly 2/2 CHF->f/u enzymes at 12PM  - EF 15% by outpt Echo in [employer]. Echo ordered, f/u results  - s/p Lasix 40mg IV x 1 in ED. Will f/u results of cath and order diuresis based on findings. Holding ARB in light of pending cath and possible switch to Entresto post cath given severely reduced EF. Will start BB after cath pending volume status  - Plan for R+L heart cath today with Dr Trinh. Consent obtained and in chart  - Loaded with ASA 325mg PO x 1 and Plavix 600mg PO x 1 on 4/1/21  - Core measures, strict I/Os, daily weights, 1L fluid restriction Plan: Currently mildly overloaded on exam, JVD, resolved.  Trace-1+ pitting edema b/l, BNP 8K, CXR: Probable bronchiectasis at both lung bases, much worse on left. Cardiomegaly.   -Cardiac enzymes mildly elevated, trops 0.02 x 2, possibly 2/2 CHF  -EF 15% by outpt Echo in [employer].   -ECHO 4/1:  EF 15-20%, grade 3 diastolic dysfunction, dilated LV, mild concentric LVH, regional WMA noted, mild to moderate hypokinesis of basal to mid inferolateral, basal anterolateral and basal anterior wall, rest of walls are severely hypokinetic to akinetic, contrast swirling noted at the apex, no obvious thrombus noted, moderately dilated LA, dilated RA, mild-moderate AR, mild MR (severity may be underestimated due to eccentricity), mild TR/NY, PASP 33mmHg, small pericardial effusion  -Normal coronaries on LHC, RHC w/ elevated pressures and PHTN somewhat responsive to Nitro  -C/w Lasix 40mg IV BID and Entresto 24/26mg BID.  Coreg 12.5mg BID initiated on 4/2.  Home Benicar on hold  -Dr Vilchis consulted for Pulm HTN. Agreed w/ IV diuresis, consider transition to PO Lasix 20mg daily tomorrow, autoimmune work-up done. F/U results. Recommends CMRI as part NICM and CRT-D in the future if no improvement in LVEF on GDMT  - EP consulted for follow up of NICM. Pt to follow up at discharge with Dr. Donald  -HF Team consult, recs appreciated.  Cardiac amyloidosis w/ SPEP and UPEP given severe diastolic dysfunction w/ right sided involvement.  Consider PYP imaging as outpatient.   -Known to Pulm, Dr. Atkins, will need PFTs outpt.  -Pt to follow up with Mery Cassidy in 5-7 days for continued heart failure management upon discharge.   -Core measures, strict I/Os, daily weights, 1L fluid restriction.

## 2021-04-02 NOTE — DIETITIAN INITIAL EVALUATION ADULT. - OTHER CALCULATIONS
current body wt used for energy calculations as pt falls within % IBW; adjusted for age, CHF; fluids per team

## 2021-04-02 NOTE — DISCHARGE NOTE PROVIDER - NSDCCPCAREPLAN_GEN_ALL_CORE_FT
PRINCIPAL DISCHARGE DIAGNOSIS  Diagnosis: Acute systolic congestive heart failure  Assessment and Plan of Treatment: You have a weak heart or heart failure. Continue medications exactly as listed. Avoid drinking more than 1.5L of fluid daily. Maintain a low salt A Mediterranean Diet is recommended! Some suggestions include continue incorporating 2 or more servings per day of vegetables, fruits, and whole grains. Increase intake of fish and legumes/beans to 2 or more servings per week. Aim to increase intake of healthy fats, such as olive oil and avocados, and have a handful of nuts/seeds most days. Reduce red/processed meat consumption to 2 or fewer times per week. and weigh yourself daily. For any significant increases in daily weight with associated swelling in the legs or abdomen with shortness of breath, please call your doctor or go to the emergency room. Follow up with Dr. Solorzano in 1-2 weeks  Please continue with Entresto 24mg/26mg twice daily, ________________        SECONDARY DISCHARGE DIAGNOSES  Diagnosis: HLD (hyperlipidemia)  Assessment and Plan of Treatment: Please continue ____ to keep your cholesterol low. High cholesterol contributes to heart disease.      Diagnosis: HTN (hypertension)  Assessment and Plan of Treatment: Please continue Entresto 24mg/26mg twice daily to keep your blood pressure controlled. For blood pressure that is too high or too low please see your doctor or go to the emergency room as necessary.       PRINCIPAL DISCHARGE DIAGNOSIS  Diagnosis: Acute systolic congestive heart failure  Assessment and Plan of Treatment: You have a weak heart or heart failure. Continue medications exactly as listed. Avoid drinking more than 1.5L of fluid daily. Maintain a low salt A Mediterranean Diet is recommended! Some suggestions include continue incorporating 2 or more servings per day of vegetables, fruits, and whole grains. Increase intake of fish and legumes/beans to 2 or more servings per week. Aim to increase intake of healthy fats, such as olive oil and avocados, and have a handful of nuts/seeds most days. Reduce red/processed meat consumption to 2 or fewer times per week. and weigh yourself daily. For any significant increases in daily weight with associated swelling in the legs or abdomen with shortness of breath, please call your doctor or go to the emergency room.   ---Please make follow up appointment with Heart Failure, NP Mery King in one week  and Cardio-Pulmonary, Dr. Vilchis in 1-2 weeks  at [address] at [phone]  ---Follow up with Cardiologist, Dr. Solorzano in 1-2 weeks  ---Follow up with Pulmonologist, Dr. Atkins in 1-2 weeks for Pulmonary Function Test  ---Follow with Electrophysiologist, Dr. Donald at [phone]  Please continue with Lasix 40mg orally daily, Entresto 24mg/26mg twice daily and Carvedilol 12.5mg twice daily.  DO NOT STOP THESE MEDICATIONS WITHOUT TALKING TO YOUR PHYSICIAN.         SECONDARY DISCHARGE DIAGNOSES  Diagnosis: HTN (hypertension)  Assessment and Plan of Treatment: Please continue Carvedilol 12.5mg twice daily, Lasix 40mg daily and  and Entresto 24mg/26mg twice daily to keep your blood pressure controlled. For blood pressure that is too high or too low please see your doctor or go to the emergency room as necessary.       PRINCIPAL DISCHARGE DIAGNOSIS  Diagnosis: Acute on chronic combined systolic and diastolic congestive heart failure  Assessment and Plan of Treatment: You have a weak heart or heart failure. Continue medications exactly as listed. Avoid drinking more than 1.5L of fluid daily. Maintain a low salt A Mediterranean Diet is recommended! Some suggestions include continue incorporating 2 or more servings per day of vegetables, fruits, and whole grains. Increase intake of fish and legumes/beans to 2 or more servings per week. Aim to increase intake of healthy fats, such as olive oil and avocados, and have a handful of nuts/seeds most days. Reduce red/processed meat consumption to 2 or fewer times per week. and weigh yourself daily. For any significant increases in daily weight with associated swelling in the legs or abdomen with shortness of breath, please call your doctor or go to the emergency room.   ---Please make follow up appointment with Heart Failure, NP Mery King in one week  and Cardio-Pulmonary, Dr. Vilchis in 1-2 weeks  at [address] at [phone]  ---Follow up with Cardiologist, Dr. Solorzano in 1-2 weeks  ---Follow up with Pulmonologist, Dr. Atkins in 1-2 weeks for Pulmonary Function Test  ---Follow with Electrophysiologist, Dr. Donald at [phone]  Please continue with Lasix 40mg orally daily, Entresto 24mg/26mg twice daily and Carvedilol 12.5mg twice daily.  DO NOT STOP THESE MEDICATIONS WITHOUT TALKING TO YOUR PHYSICIAN.      SECONDARY DISCHARGE DIAGNOSES  Diagnosis: HTN (hypertension)  Assessment and Plan of Treatment: Please continue Carvedilol 12.5mg twice daily, Lasix 40mg daily and  and Entresto 24mg/26mg twice daily to keep your blood pressure controlled. For blood pressure that is too high or too low please see your doctor or go to the emergency room as necessary.

## 2021-04-02 NOTE — CONSULT NOTE ADULT - ASSESSMENT
65 M w/ hx of newly diagnosed heart failure w/ reduced ejection fraction presents w/ dyspnea w/ exertion, chest tightness, and decreased functional capacity. He is admitted to cardiac telemetry for continued management and workup. Heart failure consulted for management of newly diagnosed systolic dysfunction w/ EG 15%.    #Heart Failure w/ Combined Systolic and Diastolic Dysfunction: Acute, newly diagnosed. Clinically hypervolemic. Non-ischemic CM; LHC clean coronaries. RHD revealed PH w/ mPAP 52 --> 42 after nitric oxide challenge. 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   - Agree with further diuresis w/ Lasix 40mg IVP BID to maintain net negative 2L  - Hemodynamically stable and tolerating GDMT well; continue with Coreg 12.5 BID and Entresto 24/26mg BID (Ensure insurance coverage)   - Given severe diastolic dysfunction w/ right sided involvement, would start inpatient workup for cardiac amyloidosis w/ SPEP and UPEP  - Will consider PYP imaging as outpatient  - Will consider ICD once on optimal GDMT x 3 months  - Will uptitate medications as outpatient if hemodynamically appropriate     Ensure patient has follow up with Mery Cassidy in 5-7 days for continued heart failure management   Plan discussed w/ Dr. Muller

## 2021-04-02 NOTE — DISCHARGE NOTE PROVIDER - NSDCMRMEDTOKEN_GEN_ALL_CORE_FT
Benicar 40 mg oral tablet: 1 tab(s) orally once a day  hydroCHLOROthiazide 12.5 mg oral capsule: 1 cap(s) orally once a day  lansoprazole 30 mg oral delayed release capsule: 1 cap(s) orally once a day  Viagra 100 mg oral tablet: 1 tab(s) orally prn, As Needed   Benicar 40 mg oral tablet: 1 tab(s) orally once a day  Entresto 24 mg-26 mg oral tablet: 1 tab(s) orally 2 times a day   hydroCHLOROthiazide 12.5 mg oral capsule: 1 cap(s) orally once a day  lansoprazole 30 mg oral delayed release capsule: 1 cap(s) orally once a day  Viagra 100 mg oral tablet: 1 tab(s) orally prn, As Needed   Entresto 24 mg-26 mg oral tablet: 1 tab(s) orally 2 times a day   lansoprazole 30 mg oral delayed release capsule: 1 cap(s) orally once a day  pantoprazole 40 mg oral delayed release tablet: 1 tab(s) orally once a day (before a meal)  Viagra 100 mg oral tablet: 1 tab(s) orally prn, As Needed   Aspirin Enteric Coated 81 mg oral delayed release tablet: 1 tab(s) orally once a day   carvedilol 12.5 mg oral tablet: 1 tab(s) orally every 12 hours  Entresto 24 mg-26 mg oral tablet: 1 tab(s) orally 2 times a day   furosemide 40 mg oral tablet: 1 tab(s) orally once a day  lansoprazole 30 mg oral delayed release capsule: 1 cap(s) orally once a day  pantoprazole 40 mg oral delayed release tablet: 1 tab(s) orally once a day (before a meal)  Viagra 100 mg oral tablet: 1 tab(s) orally prn, As Needed

## 2021-04-02 NOTE — DISCHARGE NOTE PROVIDER - CARE PROVIDERS DIRECT ADDRESSES
[email or web address],DirectAddress_Unknown,DirectAddress_Unknown,DirectAddress_Unknown

## 2021-04-02 NOTE — PROGRESS NOTE ADULT - SUBJECTIVE AND OBJECTIVE BOX
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    PAST MEDICAL & SURGICAL HISTORY:  HTN (hypertension)    GERD (gastroesophageal reflux disease)    S/P TURP        SOCIAL HISTORY:  FAMILY HISTORY:  No pertinent family history in first degree relatives        ALLERGIES: 	  No Known Allergies            MEDICATIONS:  aspirin enteric coated 81 milliGRAM(s) Oral daily  carvedilol 12.5 milliGRAM(s) Oral every 12 hours  furosemide   Injectable 40 milliGRAM(s) IV Push two times a day  influenza  Vaccine (HIGH DOSE) 0.7 milliLiter(s) IntraMuscular once  pantoprazole    Tablet 40 milliGRAM(s) Oral before breakfast  sacubitril 24 mG/valsartan 26 mG 1 Tablet(s) Oral two times a day      PHYSICAL EXAM:  T(C): 36.7 (04-02-21 @ 14:00), Max: 36.9 (04-01-21 @ 22:13)  HR: 87 (04-02-21 @ 16:20) (67 - 87)  BP: 145/76 (04-02-21 @ 16:20) (132/83 - 166/107)  RR: 18 (04-02-21 @ 16:20) (16 - 18)  SpO2: 97% (04-02-21 @ 16:20) (94% - 98%)  Wt(kg): --    GEN: Awake, comfortable. NAD.   HEENT: NCAT, PERRL, EOMI. Mucosa moist. No JVD.   RESP: CTA b/l  CV: RRR, normal s1/s2. No m/r/g.  ABD: Soft, NTND. BS+  EXT: Warm. No edema, clubbing, or cyanosis.   NEURO: AAOx3. No focal deficits.    I&O's Summary    01 Apr 2021 07:01  -  02 Apr 2021 07:00  --------------------------------------------------------  IN: 0 mL / OUT: 1800 mL / NET: -1800 mL      Height (cm): 172.7 (04-01 @ 21:01)  	  LABS:	 	    CARDIAC MARKERS:                                  14.9   6.13  )-----------( 437      ( 02 Apr 2021 08:19 )             48.1     04-02    145  |  103  |  20  ----------------------------<  94  3.4<L>   |  28  |  1.25    Ca    9.7      02 Apr 2021 08:20  Mg     2.0     04-02    TPro  7.5  /  Alb  4.0  /  TBili  2.2<H>  /  DBili  x   /  AST  29  /  ALT  43  /  AlkPhos  107  04-02    proBNP:   Lipid Profile:   HgA1c:   TSH:     TELEMETRY: 	    ECG:  	  RADIOLOGY:   ECHO:  STRESS:  CATH:     66 y/o Male, former smoker, BPH s/p TURP, HTN p/w worsening NIELSEN, LE edema found to have severely reduced biventricular heart failure 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              PAST MEDICAL & SURGICAL HISTORY:  HTN (hypertension)    GERD (gastroesophageal reflux disease)    S/P TURP        SOCIAL HISTORY:  FAMILY HISTORY:  No pertinent family history in first degree relatives        ALLERGIES: 	  No Known Allergies            MEDICATIONS:  aspirin enteric coated 81 milliGRAM(s) Oral daily  carvedilol 12.5 milliGRAM(s) Oral every 12 hours  furosemide   Injectable 40 milliGRAM(s) IV Push two times a day  influenza  Vaccine (HIGH DOSE) 0.7 milliLiter(s) IntraMuscular once  pantoprazole    Tablet 40 milliGRAM(s) Oral before breakfast  sacubitril 24 mG/valsartan 26 mG 1 Tablet(s) Oral two times a day      PHYSICAL EXAM:  T(C): 36.7 (04-02-21 @ 14:00), Max: 36.9 (04-01-21 @ 22:13)  HR: 87 (04-02-21 @ 16:20) (67 - 87)  BP: 145/76 (04-02-21 @ 16:20) (132/83 - 166/107)  RR: 18 (04-02-21 @ 16:20) (16 - 18)  SpO2: 97% (04-02-21 @ 16:20) (94% - 98%)  Wt(kg): --    GEN: Awake, comfortable. NAD.   HEENT: NCAT, PERRL, EOMI. Mucosa moist. No significant JVD.   RESP: CTA b/l  CV: RRR, normal s1/s2. No m/r/g.  ABD: Soft, NTND. BS+  EXT: Warm extremities, Trace Pedal edema   NEURO: AAOx3. No focal deficits.    I&O's Summary    01 Apr 2021 07:01  -  02 Apr 2021 07:00  --------------------------------------------------------  IN: 0 mL / OUT: 1800 mL / NET: -1800 mL      Height (cm): 172.7 (04-01 @ 21:01)  	  LABS:	 	    CARDIAC MARKERS:                                  14.9   6.13  )-----------( 437      ( 02 Apr 2021 08:19 )             48.1     04-02    145  |  103  |  20  ----------------------------<  94  3.4<L>   |  28  |  1.25    Ca    9.7      02 Apr 2021 08:20  Mg     2.0     04-02    TPro  7.5  /  Alb  4.0  /  TBili  2.2<H>  /  DBili  x   /  AST  29  /  ALT  43  /  AlkPhos  107  04-02    proBNP:   Lipid Profile:   HgA1c:   TSH:     TELEMETRY: 	    ECG:  	  RADIOLOGY:   ECHO:  STRESS:  CATH:

## 2021-04-02 NOTE — PROGRESS NOTE ADULT - PROBLEM SELECTOR PLAN 5
C/w Pantoprazole 40mg QD    VTE: Holding for cath  Dispo: Anticipate discharge in 24/48 hrs pending clinical progression w/ no needs -K 3.4 ordered for  KCl 60mEq PO total given. F/u repeat BMP in AM

## 2021-04-02 NOTE — DIETITIAN INITIAL EVALUATION ADULT. - PERSON TAUGHT/METHOD
Extensive CHF education provided. Pt very receptive./verbal instruction/patient instructed/teach back - (Patient repeats in own words)
loud systolic murmur best heard at A valve/murmur

## 2021-04-02 NOTE — PROGRESS NOTE ADULT - SUBJECTIVE AND OBJECTIVE BOX
S: Pt seen and examined bedside, NAD. HD stable.   Patient denies C/P, SOB, N/V, dizziness, palpitations, and diaphoresis or any other complaints at this time.     12 Point ROS otherwise negative except as per HPI/subjective.     O: Vital Signs Last 24 Hrs  T(C): 36.6 (2021 18:09), Max: 36.9 (2021 22:13)  T(F): 97.8 (2021 18:09), Max: 98.5 (2021 22:13)  HR: 87 (2021 16:20) (67 - 87)  BP: 145/76 (2021 16:20) (132/83 - 154/94)  BP(mean): 117 (2021 05:31) (101 - 118)  RR: 18 (2021 16:20) (18 - 18)  SpO2: 97% (2021 16:20) (94% - 98%)    PHYSICAL EXAM:  GEN: NAD  HEENT: Supple, no JVD B/L  PULM:  Bibisilar rales, no RRW B/L   CARD:  RRR, S1 and S2   ABD: +BS, NT, soft/ND	  EXT: No Edema B/L LE  NEURO: A+Ox3, no focal deficit  PSYCH: Mood Appropriate    LABS:                        14.9   6.13  )-----------( 437      ( 2021 08:19 )             48.1     04-    145  |  103  |  20  ----------------------------<  94  3.4<L>   |  28  |  1.25    Ca    9.7      2021 08:20  Mg     2.0     04-    TPro  7.5  /  Alb  4.0  /  TBili  2.2<H>  /  DBili  x   /  AST  29  /  ALT  43  /  AlkPhos  107  04-02    PT/INR - ( 2021 06:25 )   PT: 15.8 sec;   INR: 1.33          PTT - ( 2021 06:25 )  PTT:32.7 sec  Troponin T, Serum: 0.02 ng/mL (21 @ 15:15)  Troponin T, Serum: 0.02 ng/mL (21 @ 06:25)       @ 07:01  -   @ 07:00  --------------------------------------------------------  IN: 0 mL / OUT: 1800 mL / NET: -1800 mL      Daily Height in cm: 172.72 (2021 21:01)    Daily Weight in k.6 (2021 06:09)

## 2021-04-02 NOTE — CONSULT NOTE ADULT - SUBJECTIVE AND OBJECTIVE BOX
HPI:  65 M w/ hx of newly diagnosed heart failure w/ reduced ejection fraction presents w/ dyspnea w/ exertion, chest tightness, and decreased functional capacity. He is admitted to cardiac telemetry for continued management and workup. Heart failure consulted for management of newly diagnosed systolic dysfunction w/ EG 15%. He is s/p right and left heart catheterization, which revealed normal coronaries. Right heart numbers are below. He reports being otherwise healthy w/ the exception of HTN, for which he sees Dr. Solorzano. Outpatient TTE showed EF 15% w/ mod-severe MR. Upon admission, he was volume overloaded, started on IV Lasix, and underwent cardiac catheterization. Since initiating IV Lasix, his symptoms are improved, without SOB or CP.       CARDIAC DIAGNOSTIC TESTING:      TELEMETRY: NSR, frequent ectopy     ECG: NSR, IVCD, PVC's     ECHO FINDINGS:  TTE Echo Complete w/ Contrast w/ Doppler 04.01.21   CONCLUSIONS:   1. Biatrial enlargement.   2. Mild-to-moderate aortic regurgitation.   3. There is mild mitral regurgitation. Severity might be slightly underestimated due to eccentricity.   4. There is mild tricuspid regurgitation. Pulmonary artery systolic pressure (estimated using the tricuspid regurgitant gradient and an estimate of right atrial pressure) is 33 mmHg.   5. The right ventricle is dilated. Right ventricular systolic function is reduced.   6. The left ventricle cavity size is dilated. There is mild concentric left ventricular hypertrophy. Left ventricular systolic function is severely reduced with a calculated ejection fraction of 15-20% with regional wall motion abnormalities. Mild to moderate hypokinesis of basal to mid inferolateral, basal anterolateral and basal anterior wall. Rest of the walls areseverely hypokinetic to akinetic. Contrast swirling noted at the apex. No obvious thrombus noted. Grade III diastolic dysfunction.   7. Small pericardial effusion without echocardiographic evidence of cardiac tamponade physiology.   8. No prior echo is available for comparison.      CATHETERIZATION FINDINGS: 04/01/21   LHC: Normal coronaries   RHC: RA 7, RV 65/7, PCWP 23, PA 87/37/52 (73/25/42 after Nitro), AoSat 84% RA, PA Sat 53% RA, CO 3.94, CI 2.08.    PAST MEDICAL & SURGICAL HISTORY:  HTN (hypertension)    GERD (gastroesophageal reflux disease)    S/P TURP        HOME MEDICATIONS  T(C): 36.7 (04-02-21 @ 14:00), Max: 36.9 (04-01-21 @ 22:13)  HR: 84 (04-02-21 @ 13:30) (67 - 84)  BP: 132/83 (04-02-21 @ 13:30) (132/83 - 166/107)  RR: 18 (04-02-21 @ 13:30) (16 - 18)  SpO2: 98% (04-02-21 @ 13:30) (94% - 98%)    MEDICATIONS  (STANDING):  aspirin enteric coated 81 milliGRAM(s) Oral daily  carvedilol 12.5 milliGRAM(s) Oral every 12 hours  furosemide   Injectable 40 milliGRAM(s) IV Push two times a day  influenza  Vaccine (HIGH DOSE) 0.7 milliLiter(s) IntraMuscular once  pantoprazole    Tablet 40 milliGRAM(s) Oral before breakfast  sacubitril 24 mG/valsartan 26 mG 1 Tablet(s) Oral two times a day    MEDICATIONS  (PRN):      FAMILY HISTORY:  No pertinent family history in first degree relatives      REVIEW OF SYSTEMS:  CONSTITUTIONAL: No fever, weight loss, or fatigue  EYES: No eye pain, visual disturbances, or discharge  ENMT:  No difficulty hearing, tinnitus, vertigo; No sinus or throat pain  NECK: No pain or stiffness  RESPIRATORY: +  Shortness of Breath  CARDIOVASCULAR: +CP   GASTROINTESTINAL: No abdominal or epigastric pain. No nausea, vomiting, or hematemesis; No diarrhea or constipation. No melena or hematochezia.  GENITOURINARY: No dysuria, frequency, hematuria, or incontinence  NEUROLOGICAL: No headaches, memory loss, loss of strength, numbness, or tremors  SKIN: No itching, burning, rashes, or lesions   LYMPH Nodes: No enlarged glands  ENDOCRINE: No heat or cold intolerance; No hair loss  MUSCULOSKELETAL: No joint pain or swelling; No muscle, back, or extremity pain  PSYCHIATRIC: No depression, anxiety, mood swings, or difficulty sleeping  HEME/LYMPH: No easy bruising, or bleeding gums  ALLERY AND IMMUNOLOGIC: No hives or eczema    Vitals past 24 Hours: T(C): 36.7 (04-02-21 @ 14:00), Max: 36.9 (04-01-21 @ 22:13)  HR: 84 (04-02-21 @ 13:30) (67 - 84)  BP: 132/83 (04-02-21 @ 13:30) (132/83 - 166/107)  RR: 18 (04-02-21 @ 13:30) (16 - 18)  SpO2: 98% (04-02-21 @ 13:30) (94% - 98%)	    PHYSICAL EXAM:  GEN: No acute respiratory distress, appears stated age	  HEENT: Anicteric sclera, PERRL, EOMI, no oropharyngeal erythema or discharge, trachea midline  Lymphatic: No cervical lymphadenopathy  Cardiovascular: +JVD  Respiratory: faint bibasilar crackles   Gastrointestinal:  BS+, soft, non distended, non tender, no HSM  Skin: No rashes, No ecchymoses, No cyanosis  Neurologic: Non-focal, AAO x 3, strength grossly normal in all extremeties  Extremities: 1+ pitting edema   Vascular: Radial 2+, DP 2+      I&O's Detail    01 Apr 2021 07:01  -  02 Apr 2021 07:00  --------------------------------------------------------  IN:  Total IN: 0 mL    OUT:    Voided (mL): 1800 mL  Total OUT: 1800 mL    Total NET: -1800 mL          LABS:                        14.9   6.13  )-----------( 437      ( 02 Apr 2021 08:19 )             48.1     04-02    145  |  103  |  20  ----------------------------<  94  3.4<L>   |  28  |  1.25    Ca    9.7      02 Apr 2021 08:20  Mg     2.0     04-02    TPro  7.5  /  Alb  4.0  /  TBili  2.2<H>  /  DBili  x   /  AST  29  /  ALT  43  /  AlkPhos  107  04-02    CARDIAC MARKERS ( 01 Apr 2021 15:15 )  x     / 0.02 ng/mL / 184 U/L / x     / 6.4 ng/mL  CARDIAC MARKERS ( 01 Apr 2021 06:25 )  x     / 0.02 ng/mL / 217 U/L / x     / 7.4 ng/mL      PT/INR - ( 01 Apr 2021 06:25 )   PT: 15.8 sec;   INR: 1.33          PTT - ( 01 Apr 2021 06:25 )  PTT:32.7 sec    I&O's Summary    01 Apr 2021 07:01  -  02 Apr 2021 07:00  --------------------------------------------------------  IN: 0 mL / OUT: 1800 mL / NET: -1800 mL        RADIOLOGY & ADDITIONAL STUDIES:

## 2021-04-02 NOTE — DISCHARGE NOTE PROVIDER - PROVIDER TOKENS
PROVIDER:[TOKEN:[[id number]]] PROVIDER:[TOKEN:[[id number]]],PROVIDER:[TOKEN:[[id number]]],PROVIDER:[TOKEN:[[id number]]],PROVIDER:[TOKEN:[[id number]]],PROVIDER:[TOKEN:[[id number]]]

## 2021-04-02 NOTE — PROGRESS NOTE ADULT - ASSESSMENT
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

## 2021-04-02 NOTE — DIETITIAN INITIAL EVALUATION ADULT. - PROBLEM SELECTOR PLAN 3
BPs ~150s/90s on arrival  - Holding ARB as above and consider switch to Entresto post cath. Will start BB after cath pending volume status

## 2021-04-02 NOTE — PROGRESS NOTE ADULT - TIME BILLING
66 y/o Male, former smoker, BPH s/p TURP, HTN p/w worsening NIELSEN, LE edema found to have severely reduced biventricular heart failure 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    Acute decompensated HF 2/2 newly diagnosed NICM w/ severe bi-ventricular systolic dysfunction - Pt. is s/p Left and Right Heart Cath; LHC: Normal Coronaries; RHC: RA 7, RV 65/7, PCWP 23, PA 87/37/52 (73/25/42 after Nitro), AoSat 84% RA, PA Sat 53% RA, CO 3.94, CI 2.08; DP PVR 7.4Woods Units - Picture consistent w/ Severe mixed Pre/Post Capillary Pulm HTN w/o Vasoreactivity; Of note pt. hypoxemic during RHC     Plan:  -sCHF - 2/2 NICM(Unclear etiology) - No familial hx, No toxic habits(EtOH/Drug Use) -would consider Myocarditis or Infiltrative CM at the top of the differential. Pt. is currently near euvolemic on exam, No significant JVD, Trace pedal edema, no orthopnea and despite low cardiac indices appears well compensated. Agree w/ IV diuresis, consider transition to PO tomorrow; Agree w/ Carvedilol 12.5 BID and Entresto 24/26 BID; Would recommend cMRI as part NICM w/u; Pt. to have EP f/u, has Non-specific IVCD and may benefit from CRT-D in the future if no improvement in LVEF on GDMT  -Pulm HTN - Severe mixed Pre/Post Capillary Pulm HTN; Certainly a component of L sided HF w/ elevated L sided filling pressures(PCWP - 23) however R sided pressures are disproportionately elevated(DP & PVR 7.4 SAMUEL), hypoxemia(Ao2Sat 84%) during RHC may have contributed; h/o Smoking but quit 14 yrs ago - no formal diagnosis of Obstructive lung disease; Would recommend CT-Non-Contrast while inpatient to evaluate for Interstitial Lung Disease/Sarcoidosis; Would recommend outpatient PFTs; Will also send KELSEY, ds-DNA, RF, Anti-Scl 70, HIV as part of PH w/u; No evidence of significant Liver disease. Pt. does have significant RV dysfunction but currently is not in overt RHF; He is also non-hypoxic on RA; Will c/w L sided HF mgmt and optimization, Pulmonary w/u and patient will, will not start any advanced pulmonary vasodilators at this time. F/u at Pulmonary HTN clinic  -Please call [phone] to schedule appt. within one month post discharge    Jerod Vilchis MD  Cardiology Attending - Pulmonary HTN

## 2021-04-02 NOTE — DIETITIAN INITIAL EVALUATION ADULT. - PROBLEM SELECTOR PLAN 2
BUN/Cr 21/1.27, GFR 67 on admission. Pt denies h/o CKD, but likely component of CKD vs ISHMAEL 2/2 CHF  - UA ordered, f/u results  - K 3.3 on admission, ordered for KCl 40mEq PO x 2. F/u repeat BMP at 8PM  - Continue to monitor

## 2021-04-02 NOTE — DISCHARGE NOTE PROVIDER - HOSPITAL COURSE
64 y/o Male, former smoker, with PMHx of HTN, GERD, and h/o TURP who presented to [facility] ED on 4/1/21 from Dr Solorzano's office c/o progressively worsening NIELSEN after block, w/ associated chest tightness, after walking 1 block, over the last 2 months, but particularly worse over the last week. Pt also c/o orthopnea, PND, LE edema over the last week. Pt reportedly was active without issues prior to these last couple of months. He reports never having Covid, but did get the J/J Covid vaccine last month. Pt denies fevers, chills, cough, HA, dizziness, syncope, palpitations, n/v/d, melena, BRBPR, dysuria, recent travel, sick contacts, or any other symptoms at this time. Dr. Solorzano performed Echo on 3/31/21 revealing EF 15%, severe global hypokinesis w/ normal contraction at the basal anterior, basal posterolateral and the basal anterolateral LV walls, dilated/hypertrophic LV, LV diastolic dysfunction, RV systolic dysfunction, massively enlarged LA, severely enlarged RA, moderate AR, moderate to severe MR (multiple jets with main jet impinging posterior LA wall), mild-moderate WV/TR, RVSP 42mmHg, small pericardial effusion. Pt was subsequently referred to ED by Dr Solorzano for cardiac cath as part of workup of systolic CHF. Pt underwent right and left cardiac cath 4/1/2021: normal coronaries, RA 7, RV 65/7, PCWP 23, PA 87/37/52 (73/25/42 after Nitro), AoSat 84% RA, PA Sat 53% RA, CO 3.94, CI 2.08. Pt also had Echo on 4/1/21: EF 15-20%, grade 3 diastolic dysfunction, dilated LV, mild concentric LVH, regional WMA noted, mild to moderate hypokinesis of basal to mid inferolateral, basal anterolateral and basal anterior wall, rest of walls are severely hypokinetic to akinetic, contrast swirling noted at the apex, no obvious thrombus noted, moderately dilated LA, dilated RA, mild-moderate AR, mild MR (severity may be underestimated due to eccentricity), mild TR/WV, PASP 33mmHg, small pericardial effusion. Pt was diuresed with Lasix 40mg IVP BID, and transitioned to Lasix ____________ mg PO on 4/2. Pt has been seen and examined at bedside this am. Pt is out of bed and ambulating with no complaints. Right radial access site stable. Radial pulse at baseline. Lab values, telemetry, and vital signs reviewed and remained stable. Discharge medication regimen reviewed with patient and Dr. Trinh. Pt will continue w/ aspirin 81mg daily, Protonix 40mg daily, Entresto 24-26mg twice daily,_____________. All discharge instructions reviewed with patient and medications e-prescribed to pharmacy. Pt is cleared for discharge per Dr. Trinh, and will follow up with Dr. Solorzano within 1-2 weeks of discharge.   66 y/o Male, former smoker, with PMHx of HTN, GERD, and h/o TURP who presented to [facility] ED on 4/1/21 from Dr Solorzano's office c/o progressively worsening NIELSEN after block, w/ associated chest tightness, after walking 1 block, over the last 2 months, but particularly worse over the last week. Pt also c/o orthopnea, PND, LE edema over the last week. Pt reportedly was active without issues prior to these last couple of months. He reports never having Covid, but did get the J/J Covid vaccine last month. Pt denies fevers, chills, cough, HA, dizziness, syncope, palpitations, n/v/d, melena, BRBPR, dysuria, recent travel, sick contacts, or any other symptoms at this time. Dr. Solorzano performed Echo on 3/31/21 revealing EF 15%, severe global hypokinesis w/ normal contraction at the basal anterior, basal posterolateral and the basal anterolateral LV walls, dilated/hypertrophic LV, LV diastolic dysfunction, RV systolic dysfunction, massively enlarged LA, severely enlarged RA, moderate AR, moderate to severe MR (multiple jets with main jet impinging posterior LA wall), mild-moderate NJ/TR, RVSP 42mmHg, small pericardial effusion. Pt was subsequently referred to ED by Dr Solorzano for cardiac cath as part of workup of systolic CHF.     Pt underwent right and left cardiac cath 4/1/2021: normal coronaries, RA 7, RV 65/7, PCWP 23, PA 87/37/52 (73/25/42 after Nitro), AoSat 84% RA, PA Sat 53% RA, CO 3.94, CI 2.08. Pt with repeat Echo on 4/1/21 revealing EF 15-20%, grade 3 diastolic dysfunction, dilated LV, mild concentric LVH, regional WMA noted, mild to moderate hypokinesis of basal to mid inferolateral, basal anterolateral and basal anterior wall, rest of walls are severely hypokinetic to akinetic, contrast swirling noted at the apex, no obvious thrombus noted, moderately dilated LA, dilated RA, mild-moderate AR, mild MR (severity may be underestimated due to eccentricity), mild TR/NJ, PASP 33mmHg, small pericardial effusion. Pt was diuresed with Lasix 40mg IVP BID, and transitioned to Lasix 40mg orally daily, Entresto 24/26mg BID and Carvedilol 12.5mg BID at discharge. Pt was also evaluated by heart failure team.  Consider PYP imaging as outpatient.  Pt is to follow up with NP, Mery King @ [facility] [place] at [phone] in 5-7 days post discharge.       --Dr Vilchis consulted for Pulm HTN, agrees with current medical regimen, recommends CMRI as part NICM work-up and CRT-D in the future if no improvement in LVEF on GDMT.  Autoimmune  and Amyloid work-up     done inpatient, results pending and will be discussed with pt at office visit.   Also recommended to follow up with his Pulmonologist, Dr. Atkins for outpatient PFT's.   -- EP consulted for follow up of NICM. and possible future device placement,    Pt to follow up with Dr. Donald at [phone].     Pt has been seen and examined at bedside this am. Pt is out of bed and ambulating with no complaints. Right radial access site stable. Radial pulse at baseline. Lab values, telemetry, and vital signs reviewed and remained stable. Discharge medication regimen reviewed with patient and Dr. Trinh. Pt will continue w/ aspirin 81mg daily, Protonix 40mg daily, Entresto 24-26mg twice daily,_____________. All discharge instructions reviewed with patient and medications e-prescribed to pharmacy. Pt is cleared for discharge per Dr. Trinh, and will follow up with Dr. Solorzano within 1-2 weeks of discharge.    CT Chest 4/2: Severe centrilobular emphysematous change. Moderate to severe cardiomegaly.   66 y/o Male, former smoker, with PMHx of HTN, GERD, and h/o TURP who presented to [facility] ED on 4/1/21 from Dr Solorzano's office c/o progressively worsening NIELSEN after block, w/ associated chest tightness, after walking 1 block, over the last 2 months, but particularly worse over the last week. Pt also c/o orthopnea, PND, LE edema over the last week. Pt reportedly was active without issues prior to these last couple of months. He reports never having Covid, but did get the J/J Covid vaccine last month. Pt denies fevers, chills, cough, HA, dizziness, syncope, palpitations, n/v/d, melena, BRBPR, dysuria, recent travel, sick contacts, or any other symptoms at this time. Dr. Solorzano performed Echo on 3/31/21 revealing EF 15%, severe global hypokinesis w/ normal contraction at the basal anterior, basal posterolateral and the basal anterolateral LV walls, dilated/hypertrophic LV, LV diastolic dysfunction, RV systolic dysfunction, massively enlarged LA, severely enlarged RA, moderate AR, moderate to severe MR (multiple jets with main jet impinging posterior LA wall), mild-moderate OK/TR, RVSP 42mmHg, small pericardial effusion. Pt was subsequently referred to ED by Dr Solorzano for cardiac cath as part of workup of systolic CHF.     Pt underwent right and left cardiac cath 4/1/2021: normal coronaries, RA 7, RV 65/7, PCWP 23, PA 87/37/52 (73/25/42 after Nitro), AoSat 84% RA, PA Sat 53% RA, CO 3.94, CI 2.08. Pt with repeat Echo on 4/1/21 revealing EF 15-20%, grade 3 diastolic dysfunction, dilated LV, mild concentric LVH, regional WMA noted, mild to moderate hypokinesis of basal to mid inferolateral, basal anterolateral and basal anterior wall, rest of walls are severely hypokinetic to akinetic, contrast swirling noted at the apex, no obvious thrombus noted, moderately dilated LA, dilated RA, mild-moderate AR, mild MR (severity may be underestimated due to eccentricity), mild TR/OK, PASP 33mmHg, small pericardial effusion. Pt was diuresed with Lasix 40mg IVP BID, and transitioned to Lasix 40mg orally daily, Entresto 24/26mg BID and Carvedilol 12.5mg BID at discharge. Pt was also evaluated by heart failure team.  Amyloid work-up sent, consider PYP imaging as outpatient.  Pt is to follow up with NP, Mery King @ [facility], [address] [place] [address] at [phone] in 5-7 days post discharge.  Dr Vilchis consulted for Pulm HTN, agrees with current discharge regimen, recommends CMRI as part NICM work-up and CRT-D in the future if no improvement in LVEF on GDMT.  Autoimmune  sent with results pending and will discuss findings at outpatient visit. He is to follow up with Dr. Vilchis in 1-2 weeks at [facility],  [place] in 1-2 weeks.  Pt also recommended to follow up with his Pulmonologist, Dr. Atkins for outpatient PFT's.  EP consulted for NICM. and possible device placement, in the future. He will follow up with Dr. Donald at [phone].     Pt has been seen and examined at bedside this am. Pt is out of bed and ambulating with no complaints. Right radial access site stable. Radial pulse at baseline. Lab values, telemetry, and vital signs reviewed and remained stable. Discharge medication regimen reviewed with patient and Dr. Trinh. Pt will continue w/ aspirin 81mg daily, Protonix 40mg daily, Entresto 24-26mg twice daily,_____________. All discharge instructions reviewed with patient and medications e-prescribed to pharmacy. Pt is cleared for discharge per Dr. Trinh, and will follow up with Dr. Solorzano within 1-2 weeks of discharge.    CT Chest 4/2: Severe centrilobular emphysematous change. Moderate to severe cardiomegaly.   66 y/o Male, former smoker, with PMHx of HTN, GERD, and h/o TURP who presented to [facility] ED on 4/1/21 from Dr Solorzano's office c/o progressively worsening NIELSEN after block, w/ associated chest tightness, after walking 1 block, over the last 2 months, but particularly worse over the last week. Pt also c/o orthopnea, PND, LE edema over the last week. Pt reportedly was active without issues prior to these last couple of months. He reports never having Covid, but did get the J/J Covid vaccine last month. Pt denies fevers, chills, cough, HA, dizziness, syncope, palpitations, n/v/d, melena, BRBPR, dysuria, recent travel, sick contacts, or any other symptoms at this time. Dr. Solorzano performed Echo on 3/31/21 revealing EF 15%, severe global hypokinesis w/ normal contraction at the basal anterior, basal posterolateral and the basal anterolateral LV walls, dilated/hypertrophic LV, LV diastolic dysfunction, RV systolic dysfunction, massively enlarged LA, severely enlarged RA, moderate AR, moderate to severe MR (multiple jets with main jet impinging posterior LA wall), mild-moderate MT/TR, RVSP 42mmHg, small pericardial effusion. Pt was subsequently referred to ED by Dr Solorzano for cardiac cath as part of workup of systolic CHF.     Pt underwent right and left cardiac cath 4/1/2021: normal coronaries, RA 7, RV 65/7, PCWP 23, PA 87/37/52 (73/25/42 after Nitro), AoSat 84% RA, PA Sat 53% RA, CO 3.94, CI 2.08. Pt with repeat Echo on 4/1/21 revealing EF 15-20%, grade 3 diastolic dysfunction, dilated LV, mild concentric LVH, regional WMA noted, mild to moderate hypokinesis of basal to mid inferolateral, basal anterolateral and basal anterior wall, rest of walls are severely hypokinetic to akinetic, contrast swirling noted at the apex, no obvious thrombus noted, moderately dilated LA, dilated RA, mild-moderate AR, mild MR (severity may be underestimated due to eccentricity), mild TR/MT, PASP 33mmHg, small pericardial effusion. Pt was diuresed with Lasix 40mg IVP BID, and transitioned to Lasix 40mg orally daily, Entresto 24/26mg BID and Carvedilol 12.5mg BID at discharge. Pt was also evaluated by heart failure team.  Amyloid work-up sent, consider PYP imaging as outpatient.  Pt is to follow up with NP, Mery King @ [facility], [address] [place] [address] at [phone] in 5-7 days post discharge.  Dr Vilchis consulted for Pulm HTN, agrees with current discharge regimen, recommends CMRI as part NICM work-up and CRT-D in the future if no improvement in LVEF on GDMT.  Autoimmune  sent with results pending and will discuss findings at outpatient visit. He is to follow up with Dr. Vilchis in 1-2 weeks at [facility],  [place] in 1-2 weeks. CT Chest 4/2: Severe centrilobular emphysematous change. Moderate to severe cardiomegaly.  Pt also recommended to follow up with his Pulmonologist, Dr. Atkins for outpatient PFT's.  EP consulted for NICM. and possible device placement, in the future. He will follow up with Dr. Donald at [phone].     Pt has been seen and examined at bedside this am. Pt is out of bed and ambulating with no complaints. Right radial access site stable. Radial pulse at baseline. Lab values, telemetry, and vital signs reviewed and remained stable. Discharge medication regimen reviewed with patient and Dr. Alicia and pt is found stable for discharge.  Pt is to continue the current medical regimen listed above.  Discharge instructions reviewed with patient and medications e-prescribed to pharmacy. Pt is cleared for discharge per Dr. Trinh, and will follow up with Dr. Solorzano within 1-2 weeks of discharge.

## 2021-04-02 NOTE — DISCHARGE NOTE PROVIDER - CARE PROVIDER_API CALL
Jose Solorzano  CARDIOVASCULAR DISEASE  [address] [place] [address]  Phone: [phone]  Fax: [phone]  Follow Up Time:    Jose Solorzano  CARDIOVASCULAR DISEASE  [address] [place] [address]  Phone: [phone]  Fax: [phone]  Follow Up Time:     Ady Donald)  Cardiac Electrophysiology; Cardiovascular Disease; Internal Medicine  [address] [address]  Phone: [phone]  Fax: [phone]  Follow Up Time:     Jerod Vilchis)  Internal Medicine  [address] [address]  Phone: [phone]  Fax: [phone]  Follow Up Time:     Blaine Atkins)  Internal Medicine; Pulmonary Disease  [address] [place] [address], [facility] [address]  [place] [address]  Phone: [phone]  Fax: [phone]  Follow Up Time:     GAMAL LEON  Cardiology  Phone: [phone]  Fax: [phone]  Follow Up Time:

## 2021-04-02 NOTE — PROGRESS NOTE ADULT - ASSESSMENT
66 y/o Male former smoker, with PMHx of HTN, GERD, and h/o TURP who presented to [facility] ED on 4/1/21 from Dr Solorzano's office c/o progressively worsening NIELSEN after block, w/ associated chest tightness, after walking 1 block, over the last 2 months, but particularly worse over the last week. Pt also c/o orthopnea, PND, LE edema over the last week. Pt reportedly was active without issues prior to these last couple of months. Pt is now admitted to cardiac telemetry for further workup and management of acute systolic CHF (EF 15% by outpt Echo on 3/31/21 in [employer]) with plan for cardiac cath to r/o ischemic cardiomyopathy. 66 y/o Male former smoker, with PMHx of HTN, GERD, and h/o TURP who presented to [facility] ED on 4/1/21 from Dr Solorzano's office c/o progressively worsening NIELSEN after block, w/ associated chest tightness, after walking 1 block, over the last 2 months, but particularly worse over the last week. Pt also c/o orthopnea, PND, LE edema over the last week. Pt reportedly was active without issues prior to these last couple of months. Pt is now admitted to cardiac telemetry for further workup and management of acute systolic CHF (EF 15% by outpt Echo on 3/31/21 in [employer]) s/p R+LHC on 4/1 revealing non-obs CAD and elevated pulm pressures.    66 y/o Male former smoker, with PMHx of HTN, GERD, and h/o TURP who presented to [facility] ED on 4/1/21 from Dr Solorzano's office c/o progressively worsening NIELSEN after block, w/ associated chest tightness, after walking 1 block, over the last 2 months, but particularly worse over the last week. Pt also c/o orthopnea, PND, LE edema over the last week. Pt reportedly was active without issues prior to these last couple of months. Pt is now admitted to cardiac telemetry for further workup and management of acute systolic CHF (EF 15% by outpt Echo on 3/31/21 in [employer]) s/p R+LHC on 4/1 revealing non-obs CAD and elevated pulm pressures.  o/n IV diuresis for acute combined systolic and diastolic CHF.

## 2021-04-02 NOTE — DIETITIAN INITIAL EVALUATION ADULT. - PROBLEM SELECTOR PLAN 1
Currently mildly overloaded on exam, +JVD, bibasilar crackles, trace-1+ pitting edema b/l, BNP 8K, CXR wet read pulmonary congestion (f/u official report)  - Cardiac enzymes mildly elevated, possibly 2/2 CHF->f/u enzymes at 12PM  - EF 15% by outpt Echo in [employer]. Echo ordered, f/u results  - s/p Lasix 40mg IV x 1 in ED. Will f/u results of cath and order diuresis based on findings. Holding ARB in light of pending cath and possible switch to Entresto post cath given severely reduced EF. Will start BB after cath pending volume status  - Plan for R+L heart cath today with Dr Trinh. Consent obtained and in chart  - Loaded with ASA 325mg PO x 1 and Plavix 600mg PO x 1 on 4/1/21  - Core measures, strict I/Os, daily weights, 1L fluid restriction

## 2021-04-02 NOTE — PROGRESS NOTE ADULT - PROBLEM SELECTOR PLAN 4
A1c 5.9, denies h/o DM or Pre-DM  - Nutrition consulted A1c 5.9, denies h/o DM or Pre-DM  - Nutrition consulted, F/U recs

## 2021-04-02 NOTE — PROGRESS NOTE ADULT - PROBLEM SELECTOR PROBLEM 1
Acute systolic congestive heart failure Acute combined systolic and diastolic congestive heart failure

## 2021-04-02 NOTE — DIETITIAN INITIAL EVALUATION ADULT. - OTHER INFO
64 y/o Male former smoker, with PMHx of HTN, GERD, and h/o TURP who presented to [facility] ED on 4/1/21 from Dr Solorzano's office c/o progressively worsening NIELSEN after block, associated chest tightness, after walking 1 block, over the last 2 months, but particularly worse over the last week. Pt is now admitted to cardiac telemetry for further workup and management of acute systolic CHF (EF 15% by outpt Echo on 3/31/21 in [employer]) with plan for cardiac cath to r/o ischemic cardiomyopathy.  Pt consulted to be seen, alert in room and very happy to have RD visit. Good PO intake PTA/at this time. Current order for DASH tlc consCHO, 1000ml FR. PTA pt doing own cooking, not often eating out, using fresh food, no canned foods however using salt when cooking - pt aware of need for limit salt intake as well as fluids now. Agreeable to education, please see below. NKFA.  pounds, admitted at 168 pounds likely d/t fluids shifts 2/2 CHF. No edema presently. No pressure ulcers. No pain. BM 3/1, GI WDL.  Please see below for nutritions recommendations.

## 2021-04-02 NOTE — DISCHARGE NOTE PROVIDER - NSDCDCMDCOMP_GEN_ALL_CORE
Pt discharged, pt AAOx3, VSS. Instructions and prescriptions given and explained. Pt verbalized understanding. Transport called.   This document is complete and the patient is ready for discharge.

## 2021-04-02 NOTE — PROGRESS NOTE ADULT - PROBLEM SELECTOR PLAN 2
BUN/Cr 21/1.27, GFR 67 on admission. Pt denies h/o CKD, but likely component of CKD vs ISHMAEL 2/2 CHF  - UA ordered, f/u results  - K 3.3 on admission, ordered for KCl 40mEq PO x 2. F/u repeat BMP at 8PM  - Continue to monitor BUN/Cr 21/1.27, GFR 67 on admission. Pt denies h/o CKD, but likely component of CKD vs ISHMAEL 2/2 CHF  -Crt stable 1.2, continue to monito  - UA ordered, f/u results  - Continue to monitor.

## 2021-04-03 ENCOUNTER — TRANSCRIPTION ENCOUNTER (OUTPATIENT)
Age: 66
End: 2021-04-03

## 2021-04-03 VITALS — TEMPERATURE: 98 F

## 2021-04-03 LAB
ANION GAP SERPL CALC-SCNC: 8 MMOL/L — SIGNIFICANT CHANGE UP (ref 5–17)
BUN SERPL-MCNC: 24 MG/DL — HIGH (ref 7–23)
CALCIUM SERPL-MCNC: 9.1 MG/DL — SIGNIFICANT CHANGE UP (ref 8.4–10.5)
CENTROMERE AB SER-ACNC: <0.2 AI — SIGNIFICANT CHANGE UP
CHLORIDE SERPL-SCNC: 102 MMOL/L — SIGNIFICANT CHANGE UP (ref 96–108)
CO2 SERPL-SCNC: 29 MMOL/L — SIGNIFICANT CHANGE UP (ref 22–31)
CREAT SERPL-MCNC: 1.33 MG/DL — HIGH (ref 0.5–1.3)
ENA SCL70 AB SER-ACNC: <0.2 AI — SIGNIFICANT CHANGE UP
GLUCOSE SERPL-MCNC: 97 MG/DL — SIGNIFICANT CHANGE UP (ref 70–99)
HCT VFR BLD CALC: 46.8 % — SIGNIFICANT CHANGE UP (ref 39–50)
HGB BLD-MCNC: 14.8 G/DL — SIGNIFICANT CHANGE UP (ref 13–17)
MAGNESIUM SERPL-MCNC: 2 MG/DL — SIGNIFICANT CHANGE UP (ref 1.6–2.6)
MCHC RBC-ENTMCNC: 26.6 PG — LOW (ref 27–34)
MCHC RBC-ENTMCNC: 31.6 GM/DL — LOW (ref 32–36)
MCV RBC AUTO: 84.2 FL — SIGNIFICANT CHANGE UP (ref 80–100)
NRBC # BLD: 0 /100 WBCS — SIGNIFICANT CHANGE UP (ref 0–0)
PLATELET # BLD AUTO: 393 K/UL — SIGNIFICANT CHANGE UP (ref 150–400)
POTASSIUM SERPL-MCNC: 4.2 MMOL/L — SIGNIFICANT CHANGE UP (ref 3.5–5.3)
POTASSIUM SERPL-SCNC: 4.2 MMOL/L — SIGNIFICANT CHANGE UP (ref 3.5–5.3)
PROT SERPL-MCNC: 6.4 G/DL — SIGNIFICANT CHANGE UP (ref 6–8.3)
PROT SERPL-MCNC: 6.4 G/DL — SIGNIFICANT CHANGE UP (ref 6–8.3)
RBC # BLD: 5.56 M/UL — SIGNIFICANT CHANGE UP (ref 4.2–5.8)
RBC # FLD: 14.8 % — HIGH (ref 10.3–14.5)
SODIUM SERPL-SCNC: 139 MMOL/L — SIGNIFICANT CHANGE UP (ref 135–145)
WBC # BLD: 5.32 K/UL — SIGNIFICANT CHANGE UP (ref 3.8–10.5)
WBC # FLD AUTO: 5.32 K/UL — SIGNIFICANT CHANGE UP (ref 3.8–10.5)

## 2021-04-03 PROCEDURE — 83036 HEMOGLOBIN GLYCOSYLATED A1C: CPT

## 2021-04-03 PROCEDURE — 86900 BLOOD TYPING SEROLOGIC ABO: CPT

## 2021-04-03 PROCEDURE — 84165 PROTEIN E-PHORESIS SERUM: CPT

## 2021-04-03 PROCEDURE — C1887: CPT

## 2021-04-03 PROCEDURE — 86901 BLOOD TYPING SEROLOGIC RH(D): CPT

## 2021-04-03 PROCEDURE — C1894: CPT

## 2021-04-03 PROCEDURE — 99239 HOSP IP/OBS DSCHRG MGMT >30: CPT

## 2021-04-03 PROCEDURE — 86235 NUCLEAR ANTIGEN ANTIBODY: CPT

## 2021-04-03 PROCEDURE — 99285 EMERGENCY DEPT VISIT HI MDM: CPT | Mod: 25

## 2021-04-03 PROCEDURE — 86769 SARS-COV-2 COVID-19 ANTIBODY: CPT

## 2021-04-03 PROCEDURE — 87635 SARS-COV-2 COVID-19 AMP PRB: CPT

## 2021-04-03 PROCEDURE — 85027 COMPLETE CBC AUTOMATED: CPT

## 2021-04-03 PROCEDURE — 85025 COMPLETE CBC W/AUTO DIFF WBC: CPT

## 2021-04-03 PROCEDURE — 86225 DNA ANTIBODY NATIVE: CPT

## 2021-04-03 PROCEDURE — 84155 ASSAY OF PROTEIN SERUM: CPT

## 2021-04-03 PROCEDURE — C8929: CPT

## 2021-04-03 PROCEDURE — 86850 RBC ANTIBODY SCREEN: CPT

## 2021-04-03 PROCEDURE — 80048 BASIC METABOLIC PNL TOTAL CA: CPT

## 2021-04-03 PROCEDURE — 83880 ASSAY OF NATRIURETIC PEPTIDE: CPT

## 2021-04-03 PROCEDURE — 83735 ASSAY OF MAGNESIUM: CPT

## 2021-04-03 PROCEDURE — 86038 ANTINUCLEAR ANTIBODIES: CPT

## 2021-04-03 PROCEDURE — 71250 CT THORAX DX C-: CPT

## 2021-04-03 PROCEDURE — 36415 COLL VENOUS BLD VENIPUNCTURE: CPT

## 2021-04-03 PROCEDURE — 85610 PROTHROMBIN TIME: CPT

## 2021-04-03 PROCEDURE — 71045 X-RAY EXAM CHEST 1 VIEW: CPT

## 2021-04-03 PROCEDURE — 82550 ASSAY OF CK (CPK): CPT

## 2021-04-03 PROCEDURE — 80061 LIPID PANEL: CPT

## 2021-04-03 PROCEDURE — 84484 ASSAY OF TROPONIN QUANT: CPT

## 2021-04-03 PROCEDURE — 84443 ASSAY THYROID STIM HORMONE: CPT

## 2021-04-03 PROCEDURE — 82553 CREATINE MB FRACTION: CPT

## 2021-04-03 PROCEDURE — 86200 CCP ANTIBODY: CPT

## 2021-04-03 PROCEDURE — 80053 COMPREHEN METABOLIC PANEL: CPT

## 2021-04-03 PROCEDURE — C1769: CPT

## 2021-04-03 PROCEDURE — 85652 RBC SED RATE AUTOMATED: CPT

## 2021-04-03 PROCEDURE — 86255 FLUORESCENT ANTIBODY SCREEN: CPT

## 2021-04-03 PROCEDURE — 86803 HEPATITIS C AB TEST: CPT

## 2021-04-03 PROCEDURE — 85730 THROMBOPLASTIN TIME PARTIAL: CPT

## 2021-04-03 PROCEDURE — 86140 C-REACTIVE PROTEIN: CPT

## 2021-04-03 RX ORDER — ASPIRIN/CALCIUM CARB/MAGNESIUM 324 MG
1 TABLET ORAL
Qty: 30 | Refills: 11
Start: 2021-04-03 | End: 2022-03-28

## 2021-04-03 RX ORDER — FUROSEMIDE 40 MG
1 TABLET ORAL
Qty: 30 | Refills: 0
Start: 2021-04-03 | End: 2021-05-02

## 2021-04-03 RX ORDER — FUROSEMIDE 40 MG
1 TABLET ORAL
Qty: 30 | Refills: 11
Start: 2021-04-03 | End: 2022-03-28

## 2021-04-03 RX ORDER — PANTOPRAZOLE SODIUM 20 MG/1
1 TABLET, DELAYED RELEASE ORAL
Qty: 0 | Refills: 0 | DISCHARGE
Start: 2021-04-03

## 2021-04-03 RX ORDER — CARVEDILOL PHOSPHATE 80 MG/1
1 CAPSULE, EXTENDED RELEASE ORAL
Qty: 60 | Refills: 0
Start: 2021-04-03 | End: 2021-05-02

## 2021-04-03 RX ORDER — ASPIRIN/CALCIUM CARB/MAGNESIUM 324 MG
1 TABLET ORAL
Qty: 30 | Refills: 0
Start: 2021-04-03 | End: 2021-05-02

## 2021-04-03 RX ORDER — FUROSEMIDE 40 MG
40 TABLET ORAL DAILY
Refills: 0 | Status: DISCONTINUED | OUTPATIENT
Start: 2021-04-04 | End: 2021-04-03

## 2021-04-03 RX ORDER — SACUBITRIL AND VALSARTAN 24; 26 MG/1; MG/1
1 TABLET, FILM COATED ORAL
Qty: 60 | Refills: 0
Start: 2021-04-03 | End: 2021-05-02

## 2021-04-03 RX ORDER — OLMESARTAN MEDOXOMIL 5 MG/1
1 TABLET, FILM COATED ORAL
Qty: 0 | Refills: 0 | DISCHARGE

## 2021-04-03 RX ORDER — CARVEDILOL PHOSPHATE 80 MG/1
1 CAPSULE, EXTENDED RELEASE ORAL
Qty: 60 | Refills: 3
Start: 2021-04-03 | End: 2021-07-31

## 2021-04-03 RX ORDER — SACUBITRIL AND VALSARTAN 24; 26 MG/1; MG/1
1 TABLET, FILM COATED ORAL
Qty: 60 | Refills: 11
Start: 2021-04-03 | End: 2022-03-28

## 2021-04-03 RX ORDER — FUROSEMIDE 40 MG
1 TABLET ORAL
Qty: 30 | Refills: 3
Start: 2021-04-03 | End: 2021-07-31

## 2021-04-03 RX ORDER — CARVEDILOL PHOSPHATE 80 MG/1
1 CAPSULE, EXTENDED RELEASE ORAL
Qty: 60 | Refills: 11
Start: 2021-04-03 | End: 2022-03-28

## 2021-04-03 RX ADMIN — PANTOPRAZOLE SODIUM 40 MILLIGRAM(S): 20 TABLET, DELAYED RELEASE ORAL at 06:31

## 2021-04-03 RX ADMIN — Medication 40 MILLIGRAM(S): at 09:15

## 2021-04-03 RX ADMIN — SACUBITRIL AND VALSARTAN 1 TABLET(S): 24; 26 TABLET, FILM COATED ORAL at 06:31

## 2021-04-03 RX ADMIN — CARVEDILOL PHOSPHATE 12.5 MILLIGRAM(S): 80 CAPSULE, EXTENDED RELEASE ORAL at 06:31

## 2021-04-03 RX ADMIN — Medication 81 MILLIGRAM(S): at 11:17

## 2021-04-04 LAB — ANA TITR SER: NEGATIVE — SIGNIFICANT CHANGE UP

## 2021-04-05 LAB
% ALBUMIN: 56.8 % — SIGNIFICANT CHANGE UP
% ALPHA 1: 5 % — SIGNIFICANT CHANGE UP
% ALPHA 2: 10.5 % — SIGNIFICANT CHANGE UP
% BETA: 12.4 % — SIGNIFICANT CHANGE UP
% GAMMA: 15.3 % — SIGNIFICANT CHANGE UP
ALBUMIN SERPL ELPH-MCNC: 3.6 G/DL — SIGNIFICANT CHANGE UP (ref 3.6–5.5)
ALBUMIN/GLOB SERPL ELPH: 1.3 RATIO — SIGNIFICANT CHANGE UP
ALPHA1 GLOB SERPL ELPH-MCNC: 0.3 G/DL — SIGNIFICANT CHANGE UP (ref 0.1–0.4)
ALPHA2 GLOB SERPL ELPH-MCNC: 0.7 G/DL — SIGNIFICANT CHANGE UP (ref 0.5–1)
B-GLOBULIN SERPL ELPH-MCNC: 0.8 G/DL — SIGNIFICANT CHANGE UP (ref 0.5–1)
GAMMA GLOBULIN: 1 G/DL — SIGNIFICANT CHANGE UP (ref 0.6–1.6)
PHOSPHOLIPID SERPL-MCNC: 174 MG/DL — SIGNIFICANT CHANGE UP (ref 150–250)
PROT PATTERN SERPL ELPH-IMP: SIGNIFICANT CHANGE UP

## 2021-04-06 LAB
CCP IGG SERPL-ACNC: 29 UNITS — HIGH
DSDNA AB SER-ACNC: <12 IU/ML — SIGNIFICANT CHANGE UP
RF+CCP IGG SER-IMP: ABNORMAL

## 2021-04-08 DIAGNOSIS — I25.10 ATHEROSCLEROTIC HEART DISEASE OF NATIVE CORONARY ARTERY WITHOUT ANGINA PECTORIS: ICD-10-CM

## 2021-04-08 DIAGNOSIS — I50.21 ACUTE SYSTOLIC (CONGESTIVE) HEART FAILURE: ICD-10-CM

## 2021-04-08 DIAGNOSIS — I27.22 PULMONARY HYPERTENSION DUE TO LEFT HEART DISEASE: ICD-10-CM

## 2021-04-08 DIAGNOSIS — E78.5 HYPERLIPIDEMIA, UNSPECIFIED: ICD-10-CM

## 2021-04-08 DIAGNOSIS — I11.0 HYPERTENSIVE HEART DISEASE WITH HEART FAILURE: ICD-10-CM

## 2021-04-08 DIAGNOSIS — I50.41 ACUTE COMBINED SYSTOLIC (CONGESTIVE) AND DIASTOLIC (CONGESTIVE) HEART FAILURE: ICD-10-CM

## 2021-04-08 DIAGNOSIS — R73.03 PREDIABETES: ICD-10-CM

## 2021-04-08 DIAGNOSIS — E87.6 HYPOKALEMIA: ICD-10-CM

## 2021-04-08 DIAGNOSIS — I42.8 OTHER CARDIOMYOPATHIES: ICD-10-CM

## 2021-04-08 DIAGNOSIS — K21.9 GASTRO-ESOPHAGEAL REFLUX DISEASE WITHOUT ESOPHAGITIS: ICD-10-CM

## 2021-04-08 DIAGNOSIS — Z87.891 PERSONAL HISTORY OF NICOTINE DEPENDENCE: ICD-10-CM

## 2021-04-13 ENCOUNTER — APPOINTMENT (OUTPATIENT)
Dept: HEART AND VASCULAR | Facility: CLINIC | Age: 66
End: 2021-04-13
Payer: MEDICARE

## 2021-04-13 VITALS
SYSTOLIC BLOOD PRESSURE: 127 MMHG | HEART RATE: 59 BPM | WEIGHT: 160 LBS | BODY MASS INDEX: 24.25 KG/M2 | DIASTOLIC BLOOD PRESSURE: 69 MMHG | HEIGHT: 68 IN | OXYGEN SATURATION: 99 %

## 2021-04-13 DIAGNOSIS — Z87.39 PERSONAL HISTORY OF OTHER DISEASES OF THE MUSCULOSKELETAL SYSTEM AND CONNECTIVE TISSUE: ICD-10-CM

## 2021-04-13 PROCEDURE — 99214 OFFICE O/P EST MOD 30 MIN: CPT

## 2021-04-13 RX ORDER — PANTOPRAZOLE 40 MG/1
40 TABLET, DELAYED RELEASE ORAL
Refills: 0 | Status: DISCONTINUED | COMMUNITY
Start: 2021-04-13 | End: 2021-04-13

## 2021-04-13 NOTE — PHYSICAL EXAM
[General Appearance - Well Developed] : well developed [Normal Appearance] : normal appearance [Well Groomed] : well groomed [General Appearance - Well Nourished] : well nourished [General Appearance - In No Acute Distress] : no acute distress [Eyelids - No Xanthelasma] : the eyelids demonstrated no xanthelasmas [FreeTextEntry1] : JVP <6cm H2O, no HJR [Heart Rate And Rhythm] : heart rate and rhythm were normal [Heart Sounds] : normal S1 and S2 [Murmurs] : no murmurs present [Edema] : no peripheral edema present [] : no respiratory distress [Respiration, Rhythm And Depth] : normal respiratory rhythm and effort [Auscultation Breath Sounds / Voice Sounds] : lungs were clear to auscultation bilaterally [Bowel Sounds] : normal bowel sounds [Abdomen Soft] : soft [Abnormal Walk] : normal gait [Nail Clubbing] : no clubbing of the fingernails [Cyanosis, Localized] : no localized cyanosis [Petechial Hemorrhages (___cm)] : no petechial hemorrhages [Skin Color & Pigmentation] : normal skin color and pigmentation [Skin Turgor] : normal skin turgor [No Venous Stasis] : no venous stasis

## 2021-04-13 NOTE — DISCUSSION/SUMMARY
[FreeTextEntry1] : 66yo M with HTN and newly diagnosed NICM (EF 15-20%) on recent admission at [facility]. He was started on HF GDMT and now presents for follow up. He is ACC/AHA Stage C, NYHA Class II, euvolemic on exam. I have recommended the following:\par \par 1. NICM- Stable and well compensated but not yet on optimal HF GDMT. Check labs today. Increase Entresto to 49/51mg BID. Given the Natriuertic property of Entresto, will also decrease Lasix to 20mg daily. Hold off on uptitration of Coreg given borderline Bradycardia. Consider MRA once optimized and Entresto and recheck K. HF education provided.\par \par 2. HTN- Well controlled on current therapies. Medication adjustments as stated above. \par \par 3. LVH- LVH on Echo, MARIE. SPEP sent in hospital WNL. Will send for Cardiac MRI for structural and this could help determine if Amyloid present. Consider TcPyPP scan. \par \par 4. Follow up in 3 weeks.

## 2021-04-13 NOTE — REASON FOR VISIT
[FreeTextEntry1] : PATIENT INSTRUCTIONS:\par \par 1. Labs today. \par \par 2. Please INCREASE the ENTRESTO to 49/51mg TWICE daily. At the same time, please REDUCE the LASIX to 20mg daily. \par \par 3. We will schedule you for a Cardiac MRI. \par \par 4. Follow up in 3 weeks.

## 2021-04-14 LAB
ANION GAP SERPL CALC-SCNC: 10 MMOL/L
BUN SERPL-MCNC: 17 MG/DL
CALCIUM SERPL-MCNC: 9.3 MG/DL
CHLORIDE SERPL-SCNC: 107 MMOL/L
CO2 SERPL-SCNC: 27 MMOL/L
CREAT SERPL-MCNC: 1.11 MG/DL
GLUCOSE SERPL-MCNC: 83 MG/DL
MAGNESIUM SERPL-MCNC: 2.4 MG/DL
NT-PROBNP SERPL-MCNC: 1853 PG/ML
POTASSIUM SERPL-SCNC: 4.6 MMOL/L
SODIUM SERPL-SCNC: 145 MMOL/L

## 2021-04-20 ENCOUNTER — APPOINTMENT (OUTPATIENT)
Dept: HEART AND VASCULAR | Facility: CLINIC | Age: 66
End: 2021-04-20
Payer: MEDICARE

## 2021-04-20 VITALS
WEIGHT: 165.19 LBS | OXYGEN SATURATION: 97 % | DIASTOLIC BLOOD PRESSURE: 77 MMHG | HEIGHT: 68 IN | BODY MASS INDEX: 25.04 KG/M2 | HEART RATE: 62 BPM | SYSTOLIC BLOOD PRESSURE: 134 MMHG

## 2021-04-20 PROCEDURE — 99214 OFFICE O/P EST MOD 30 MIN: CPT

## 2021-04-22 NOTE — REASON FOR VISIT
[Follow-Up - From Hospitalization] : follow-up of a recent hospitalization for [FreeTextEntry2] : Pulmonary HTN

## 2021-04-22 NOTE — HISTORY OF PRESENT ILLNESS
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 Gillis presents today for follow-up at [facility] clinic after recent hospitalization. He currently feels well. He states his breathing has improved and there has been no return of LE edema, wt gain, abdominal fullness, orthopnea or PND. He is easing himself back into a regular exercise regimen. He is compliant with all medications and diet.

## 2021-04-22 NOTE — PHYSICAL EXAM
[FreeTextEntry1] : No JVD [Heart Rate And Rhythm] : heart rate and rhythm were normal [Heart Sounds] : normal S1 and S2 [Murmurs] : no murmurs present [Edema] : no peripheral edema present [Auscultation Breath Sounds / Voice Sounds] : lungs were clear to auscultation bilaterally [Abdomen Soft] : soft [Abdomen Tenderness] : non-tender [] : no hepato-splenomegaly [Oriented To Time, Place, And Person] : oriented to person, place, and time

## 2021-04-22 NOTE — REVIEW OF SYSTEMS
[Fever] : no fever [Recent Weight Gain (___ Lbs)] : no recent weight gain [Chills] : no chills [Feeling Fatigued] : not feeling fatigued [Shortness Of Breath] : no shortness of breath [Dyspnea on exertion] : not dyspnea during exertion [Chest Pain] : no chest pain [Lower Ext Edema] : no extremity edema [Palpitations] : no palpitations [Cough] : no cough [Abdominal Pain] : no abdominal pain [Nausea] : no nausea [Change in Appetite] : no change in appetite [Dizziness] : no dizziness

## 2021-04-22 NOTE — DISCUSSION/SUMMARY
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 Will be scheduled for cMRI for NICM w/u - if suspicion for Amyloid would pursue TcPYP scan; Will need repeat TTE in 2 mths on GDMT to determine if pt. needs CRT-D(QRS 128msec)\par -PH - Severe Combined Pre/Post cap PH - differential includes Chronic L sided heart failure w/ reactive Pre-capillary PH vs Multifactorial PH; Given CT findings and h/o smoking - I feel there's some component of Chronic lung disease. Pt. is currently non-hypoxic on RA. Will consider Pulmonary referral for outpatient PFTs\par -RTC in 2 months

## 2021-04-28 ENCOUNTER — OUTPATIENT (OUTPATIENT)
Dept: OUTPATIENT SERVICES | Facility: HOSPITAL | Age: 66
LOS: 1 days | End: 2021-04-28
Payer: MEDICARE

## 2021-04-28 ENCOUNTER — RESULT REVIEW (OUTPATIENT)
Age: 66
End: 2021-04-28

## 2021-04-28 ENCOUNTER — APPOINTMENT (OUTPATIENT)
Dept: MRI IMAGING | Facility: HOSPITAL | Age: 66
End: 2021-04-28

## 2021-04-28 PROCEDURE — A9577: CPT

## 2021-04-28 PROCEDURE — 75561 CARDIAC MRI FOR MORPH W/DYE: CPT | Mod: 26,MH

## 2021-04-28 PROCEDURE — 75561 CARDIAC MRI FOR MORPH W/DYE: CPT

## 2021-05-04 ENCOUNTER — APPOINTMENT (OUTPATIENT)
Dept: HEART AND VASCULAR | Facility: CLINIC | Age: 66
End: 2021-05-04
Payer: MEDICARE

## 2021-05-04 ENCOUNTER — TRANSCRIPTION ENCOUNTER (OUTPATIENT)
Age: 66
End: 2021-05-04

## 2021-05-04 VITALS
HEART RATE: 61 BPM | DIASTOLIC BLOOD PRESSURE: 83 MMHG | BODY MASS INDEX: 25.31 KG/M2 | HEIGHT: 68 IN | SYSTOLIC BLOOD PRESSURE: 135 MMHG | WEIGHT: 167 LBS | OXYGEN SATURATION: 97 %

## 2021-05-04 DIAGNOSIS — I51.7 CARDIOMEGALY: ICD-10-CM

## 2021-05-04 DIAGNOSIS — I10 ESSENTIAL (PRIMARY) HYPERTENSION: ICD-10-CM

## 2021-05-04 PROCEDURE — 99214 OFFICE O/P EST MOD 30 MIN: CPT

## 2021-05-04 PROCEDURE — 36415 COLL VENOUS BLD VENIPUNCTURE: CPT

## 2021-05-04 RX ORDER — FUROSEMIDE 20 MG/1
20 TABLET ORAL
Qty: 30 | Refills: 0 | Status: DISCONTINUED | COMMUNITY
Start: 2021-04-13 | End: 2021-05-04

## 2021-05-04 NOTE — HISTORY OF PRESENT ILLNESS
[FreeTextEntry1] : 65 M with newly diagnosed HFrEF (EF 15%) who presents for routine follow up. His other PMH is notable for HTN (diagnosed in 50s). He presented to [facility] with NIELSEN, chest tightness, and decreased functional capacity over the past few weeks. He had a TTE done showing combined Systolic & Diastolic HF with LVEF 15%. He had a R/LHC done with normal coronaries showing PH with mPA 52-> 42 after nitric oxide challenge. He was started on Coreg & Entresto and switched from IV Lasix to PO. Also started w/u for Cardiac Amyloid (SPEP WNL but this does note exclude ATTR amyloid) with consideration of TcPyPP scan as outpatient. Had a recent MRI not typical for Amyloid. \par \par Since being seen a few weeks ago, he continues to feel well. He has been keeping a log of his weights which have been stable at 160 lbs. He walks on flatground with no limitations and can walk a few flights of stairs. He exercises a few times a week jogging and resistance bands. He denies CP,SOB at rest, orthopnea, PND, LH/dizziness, abdominal discomfort, palpitations and syncope. His appetite is normal and bowel and bladder habits are unchanged. He has been limiting fluid and sodium intake and taking medications as directed. Does not use NSAIDS. He does not yet have an ICD. \par \par \par

## 2021-05-04 NOTE — REASON FOR VISIT
[Cardiac Failure] : cardiac failure [FreeTextEntry1] : PATIENT INSTRUCTIONS:\par \par 1. Labs today. \par \par 2. Please INCREASE the ENTRESTO to 97/103mg BID at the same time please stop the LASIX. If you notice a weight gain of 3# in 24 hours or 5# in one week, call Mery and we will restart the Lasix. \par \par 3. Follow up with Dr. Muller in 2-3 weeks and he will determine when to see me next.

## 2021-05-04 NOTE — PHYSICAL EXAM
[General Appearance - Well Developed] : well developed [Normal Appearance] : normal appearance [Well Groomed] : well groomed [General Appearance - Well Nourished] : well nourished [General Appearance - In No Acute Distress] : no acute distress [Eyelids - No Xanthelasma] : the eyelids demonstrated no xanthelasmas [Heart Rate And Rhythm] : heart rate and rhythm were normal [Heart Sounds] : normal S1 and S2 [Murmurs] : no murmurs present [Edema] : no peripheral edema present [] : no respiratory distress [Respiration, Rhythm And Depth] : normal respiratory rhythm and effort [Auscultation Breath Sounds / Voice Sounds] : lungs were clear to auscultation bilaterally [Bowel Sounds] : normal bowel sounds [Abdomen Soft] : soft [Abnormal Walk] : normal gait [Nail Clubbing] : no clubbing of the fingernails [Cyanosis, Localized] : no localized cyanosis [Petechial Hemorrhages (___cm)] : no petechial hemorrhages [Skin Color & Pigmentation] : normal skin color and pigmentation [Skin Turgor] : normal skin turgor [No Venous Stasis] : no venous stasis [FreeTextEntry1] : JVP <6cm H2O, no HJR

## 2021-05-04 NOTE — DISCUSSION/SUMMARY
[FreeTextEntry1] : 64yo M with HTN and newly diagnosed NICM (EF 15-20%) on recent admission at [facility]. He was started on HF GDMT and now presents for follow up. He is ACC/AHA Stage C, NYHA Class II, euvolemic on exam. I have recommended the following:\par \par 1. NICM- Stable and well compensated but not yet on optimal HF GDMT. Check labs today. Increase Entresto to 97/103mg BID. Given the Natriuertic property of Entresto, will also DC Lasix to 20mg daily. Can increase Coreg at next visit. Consider MRA once optimized and Entresto and recheck K. HF education provided.\par \par 2. HTN- Well controlled on current therapies. Medication adjustments as stated above. \par \par 3. LVH- LVH on Echo, MARIE. SPEP sent in hospital WNL. Cardiac MRI not suspicious for Amyloid, however, could still send for TcPP scan. Differentials on MRI included Sarcoidosis or genetic arrhythmogenic CMY. Will refer back to HF MD Dr. Muller to determine further workup he would like. \par \par 4. Follow up TBD based on Dr. Parra visit.

## 2021-05-05 LAB
ANION GAP SERPL CALC-SCNC: 14 MMOL/L
BUN SERPL-MCNC: 19 MG/DL
CALCIUM SERPL-MCNC: 9.5 MG/DL
CHLORIDE SERPL-SCNC: 106 MMOL/L
CO2 SERPL-SCNC: 20 MMOL/L
CREAT SERPL-MCNC: 1.06 MG/DL
GLUCOSE SERPL-MCNC: 74 MG/DL
MAGNESIUM SERPL-MCNC: 2.3 MG/DL
NT-PROBNP SERPL-MCNC: 2500 PG/ML
POTASSIUM SERPL-SCNC: 4.8 MMOL/L
SODIUM SERPL-SCNC: 140 MMOL/L

## 2021-05-06 PROBLEM — I10 BENIGN ESSENTIAL HTN: Status: ACTIVE | Noted: 2021-04-13

## 2021-05-06 PROBLEM — I51.7 LVH (LEFT VENTRICULAR HYPERTROPHY): Status: ACTIVE | Noted: 2021-04-13

## 2021-05-25 ENCOUNTER — APPOINTMENT (OUTPATIENT)
Dept: HEART AND VASCULAR | Facility: CLINIC | Age: 66
End: 2021-05-25
Payer: MEDICARE

## 2021-05-25 ENCOUNTER — NON-APPOINTMENT (OUTPATIENT)
Age: 66
End: 2021-05-25

## 2021-05-25 VITALS
TEMPERATURE: 97.7 F | DIASTOLIC BLOOD PRESSURE: 84 MMHG | HEART RATE: 57 BPM | BODY MASS INDEX: 24.4 KG/M2 | HEIGHT: 68 IN | SYSTOLIC BLOOD PRESSURE: 143 MMHG | WEIGHT: 161 LBS | OXYGEN SATURATION: 100 %

## 2021-05-25 PROCEDURE — 99214 OFFICE O/P EST MOD 30 MIN: CPT

## 2021-05-25 NOTE — DISCUSSION/SUMMARY
[FreeTextEntry1] : 64yo M with HTN and newly diagnosed NICM (EF 15-20%) on recent admission at [facility]. He was started on HF GDMT and now presents for follow up. He is ACC/AHA Stage C, NYHA Class II, euvolemic on exam. I have recommended the following:\par \par NICM- Stable and well compensated but not yet on optimal HF GDMT. tolerating Entresto  97/103 mg BID. no  Lasix. Given resting HR of 51, can not increase Coreg.\par \par Given 'dark urine' and tingling, will send U/A and urine C+S.\par liberalize fluid intake. < 4 liters/ day\par \par monitor weight loss. May be decreased appetite given excellent compliance with  low Na diet.\par consider malignancy workup in near future. Needs general internist.\par Pulm Htn w/u as per Dr. Vilchis to include PFTs and pulmonary consultation.\par \par Now on maximally tolerated entresto and coreg: echo in August to re-evaluate EF, pulm pressures and determine indication for prophylactic ICD

## 2021-05-25 NOTE — PHYSICAL EXAM
[General Appearance - Well Developed] : well developed [Normal Appearance] : normal appearance [Well Groomed] : well groomed [General Appearance - Well Nourished] : well nourished [General Appearance - In No Acute Distress] : no acute distress [Eyelids - No Xanthelasma] : the eyelids demonstrated no xanthelasmas [Heart Rate And Rhythm] : heart rate and rhythm were normal [Murmurs] : no murmurs present [Heart Sounds] : normal S1 and S2 [Edema] : no peripheral edema present [] : no respiratory distress [Respiration, Rhythm And Depth] : normal respiratory rhythm and effort [Auscultation Breath Sounds / Voice Sounds] : lungs were clear to auscultation bilaterally [Bowel Sounds] : normal bowel sounds [Abdomen Soft] : soft [Abnormal Walk] : normal gait [Nail Clubbing] : no clubbing of the fingernails [Cyanosis, Localized] : no localized cyanosis [Petechial Hemorrhages (___cm)] : no petechial hemorrhages [Skin Color & Pigmentation] : normal skin color and pigmentation [Skin Turgor] : normal skin turgor [No Venous Stasis] : no venous stasis [FreeTextEntry1] : JVP <6cm H2O, no HJR

## 2021-05-25 NOTE — HISTORY OF PRESENT ILLNESS
[FreeTextEntry1] : Saw in hospital in April. saw Mery King 5/4/21 in f/u.saw Mame 4/20/21 for PH.\par \par 65 M with newly diagnosed HFrEF (EF 15%). His other PMH is notable for HTN (diagnosed in 50s). He presented to [facility] with NIELSEN, chest tightness, and decreased functional capacity over the past few weeks. He had a TTE done showing combined Systolic & Diastolic HF with LVEF 15%. \par He had a R/LHC done with normal coronaries showing PH with mPA 52-> 42 after nitric oxide challenge. He was started on Coreg & Entresto and switched from IV Lasix to PO. Also started w/u for Cardiac Amyloid (SPEP WNL but this does note exclude ATTR amyloid) with consideration of TcPyPP scan as outpatient. Had a recent MRI not typical for Amyloid. \par \par Since being seen a few weeks ago, he continues to feel well. He has been keeping a log of his weights which have been stable at 160 lbs. He walks on flat ground with no limitations and can walk a few flights of stairs. He exercises a few times a week jogging and resistance bands. He denies CP,SOB at rest, orthopnea, PND, LH/dizziness, abdominal discomfort, palpitations and syncope. His appetite is normal and bowel and bladder habits are unchanged. He has been limiting fluid and sodium intake and taking medications as directed. Does not use NSAIDS. He does not yet have an ICD. \par \par Does note today a 'tingling on urination' and dark colored urine. Denies burning. Did see his urologist last week.\par Also reports unintentional weight loss.\par \par \par \par

## 2021-05-31 LAB
APPEARANCE: ABNORMAL
BACTERIA UR CULT: NORMAL
BACTERIA: NEGATIVE
BILIRUBIN URINE: NEGATIVE
BLOOD URINE: ABNORMAL
COLOR: YELLOW
GLUCOSE QUALITATIVE U: NEGATIVE
HYALINE CASTS: 3 /LPF
KETONES URINE: NORMAL
LEUKOCYTE ESTERASE URINE: NEGATIVE
MICROSCOPIC-UA: NORMAL
NITRITE URINE: NEGATIVE
PH URINE: 5.5
PROTEIN URINE: ABNORMAL
RED BLOOD CELLS URINE: 612 /HPF
SPECIFIC GRAVITY URINE: 1.02
SQUAMOUS EPITHELIAL CELLS: 6 /HPF
UROBILINOGEN URINE: NORMAL
WHITE BLOOD CELLS URINE: 1 /HPF

## 2021-09-14 ENCOUNTER — APPOINTMENT (OUTPATIENT)
Dept: HEART AND VASCULAR | Facility: CLINIC | Age: 66
End: 2021-09-14
Payer: MEDICARE

## 2021-09-14 ENCOUNTER — NON-APPOINTMENT (OUTPATIENT)
Age: 66
End: 2021-09-14

## 2021-09-14 VITALS
SYSTOLIC BLOOD PRESSURE: 139 MMHG | DIASTOLIC BLOOD PRESSURE: 80 MMHG | WEIGHT: 170 LBS | TEMPERATURE: 96.6 F | HEART RATE: 56 BPM | OXYGEN SATURATION: 97 % | HEIGHT: 68 IN | BODY MASS INDEX: 25.76 KG/M2

## 2021-09-14 DIAGNOSIS — I50.22 CHRONIC SYSTOLIC (CONGESTIVE) HEART FAILURE: ICD-10-CM

## 2021-09-14 DIAGNOSIS — I27.20 PULMONARY HYPERTENSION, UNSPECIFIED: ICD-10-CM

## 2021-09-14 DIAGNOSIS — Z90.79 ACQUIRED ABSENCE OF OTHER GENITAL ORGAN(S): ICD-10-CM

## 2021-09-14 PROCEDURE — 93000 ELECTROCARDIOGRAM COMPLETE: CPT

## 2021-09-14 PROCEDURE — 99214 OFFICE O/P EST MOD 30 MIN: CPT

## 2021-09-14 RX ORDER — SILDENAFIL 100 MG/1
100 TABLET, FILM COATED ORAL
Qty: 6 | Refills: 6 | Status: ACTIVE | COMMUNITY
Start: 2021-04-13

## 2021-09-14 NOTE — PHYSICAL EXAM
[General Appearance - Well Developed] : well developed [Normal Appearance] : normal appearance [Well Groomed] : well groomed [General Appearance - Well Nourished] : well nourished [General Appearance - In No Acute Distress] : no acute distress [Eyelids - No Xanthelasma] : the eyelids demonstrated no xanthelasmas [Heart Rate And Rhythm] : heart rate and rhythm were normal [Heart Sounds] : normal S1 and S2 [Murmurs] : no murmurs present [Edema] : no peripheral edema present [] : no respiratory distress [Respiration, Rhythm And Depth] : normal respiratory rhythm and effort [Bowel Sounds] : normal bowel sounds [Auscultation Breath Sounds / Voice Sounds] : lungs were clear to auscultation bilaterally [Abdomen Soft] : soft [Abnormal Walk] : normal gait [Cyanosis, Localized] : no localized cyanosis [Nail Clubbing] : no clubbing of the fingernails [Petechial Hemorrhages (___cm)] : no petechial hemorrhages [Skin Turgor] : normal skin turgor [Skin Color & Pigmentation] : normal skin color and pigmentation [No Venous Stasis] : no venous stasis [FreeTextEntry1] : JVP <6cm H2O, no HJR

## 2021-09-14 NOTE — HISTORY OF PRESENT ILLNESS
[FreeTextEntry1] : last seen 5/25/21\par Saw in hospital in April. saw Mery King 5/4/21 in f/u.saw Mame 4/20/21 for PH.\par \par 65 M with newly diagnosed HFrEF (EF 15%). His other PMH is notable for HTN (diagnosed in 50s). He presented to [facility] with NIELSEN, chest tightness, and decreased functional capacity over the past few weeks. He had a TTE done showing combined Systolic & Diastolic HF with LVEF 15%. \par He had a R/LHC done with normal coronaries showing PH with mPA 52-> 42 after nitric oxide challenge. He was started on Coreg & Entresto and switched from IV Lasix to PO. Also started w/u for Cardiac Amyloid (SPEP WNL but this does note exclude ATTR amyloid) with consideration of TcPyPP scan as outpatient. Had a recent MRI not typical for Amyloid. \par \par Since being seen a few weeks ago, he continues to feel well. He has been keeping a log of his weights which have been stable at 160 lbs. He walks on flat ground with no limitations and can walk a few flights of stairs. He exercises a few times a week jogging and resistance bands. He denies CP,SOB at rest, orthopnea, PND, LH/dizziness, abdominal discomfort, palpitations and syncope. His appetite is normal and bowel and bladder habits are unchanged. He has been limiting fluid and sodium intake and taking medications as directed. Does not use NSAIDS. He does not yet have an ICD. \par \par for the  'tingling on urination' and dark colored urine. urine culture negative. he saw his urologist. Likley due to prostate, it has subsided,\par states his weight has stabilized. \par In general feels well.\par \par \par \par

## 2021-09-14 NOTE — ASSESSMENT
[FreeTextEntry1] : 64yo M with HTN and newly diagnosed NICM (EF 15-20%) on recent admission at [facility]. He was started on HF GDMT and now presents for follow up. He is ACC/AHA Stage C, NYHA Class II, euvolemic on exam. I have recommended the following:\par \par NICM- Stable and well compensated but not yet on optimal HF GDMT. tolerating Entresto  97/103 mg BID. no  Lasix. Given resting HR of 51, can not increase Coreg.\par \par \par Pulm Htn w/u as per Dr. Vilchis to include PFTs and pulmonary consultation.\par \par Now on maximally tolerated entresto and coreg:\par he will see Dr. Vilchis end of the month and will arrange f/u echo. \par to  re-evaluate EF, pulm pressures and determine indication for prophylactic ICD

## 2021-11-08 ENCOUNTER — FORM ENCOUNTER (OUTPATIENT)
Age: 66
End: 2021-11-08

## 2021-11-09 ENCOUNTER — OUTPATIENT (OUTPATIENT)
Dept: OUTPATIENT SERVICES | Facility: HOSPITAL | Age: 66
LOS: 1 days | End: 2021-11-09
Payer: MEDICARE

## 2021-11-09 ENCOUNTER — LABORATORY RESULT (OUTPATIENT)
Age: 66
End: 2021-11-09

## 2021-11-09 ENCOUNTER — APPOINTMENT (OUTPATIENT)
Dept: HEART AND VASCULAR | Facility: CLINIC | Age: 66
End: 2021-11-09
Payer: MEDICARE

## 2021-11-09 VITALS
HEART RATE: 49 BPM | BODY MASS INDEX: 25.46 KG/M2 | SYSTOLIC BLOOD PRESSURE: 123 MMHG | DIASTOLIC BLOOD PRESSURE: 65 MMHG | WEIGHT: 168 LBS | HEIGHT: 68 IN | TEMPERATURE: 97 F | OXYGEN SATURATION: 97 %

## 2021-11-09 DIAGNOSIS — Z87.891 PERSONAL HISTORY OF NICOTINE DEPENDENCE: ICD-10-CM

## 2021-11-09 DIAGNOSIS — I42.9 CARDIOMYOPATHY, UNSPECIFIED: ICD-10-CM

## 2021-11-09 PROCEDURE — 93306 TTE W/DOPPLER COMPLETE: CPT | Mod: 26

## 2021-11-09 PROCEDURE — C8929: CPT

## 2021-11-09 PROCEDURE — 99214 OFFICE O/P EST MOD 30 MIN: CPT

## 2021-11-10 LAB
25(OH)D3 SERPL-MCNC: 24.5 NG/ML
ALBUMIN SERPL ELPH-MCNC: 4 G/DL
ALP BLD-CCNC: 94 U/L
ALT SERPL-CCNC: 13 U/L
ANION GAP SERPL CALC-SCNC: 12 MMOL/L
AST SERPL-CCNC: 16 U/L
BASOPHILS # BLD AUTO: 0.07 K/UL
BASOPHILS NFR BLD AUTO: 1.9 %
BILIRUB SERPL-MCNC: 0.7 MG/DL
BUN SERPL-MCNC: 12 MG/DL
CA-I SERPL-SCNC: 1.25 MMOL/L
CALCIUM SERPL-MCNC: 9.4 MG/DL
CHLORIDE SERPL-SCNC: 107 MMOL/L
CO2 SERPL-SCNC: 22 MMOL/L
CREAT SERPL-MCNC: 1 MG/DL
EOSINOPHIL # BLD AUTO: 0.12 K/UL
EOSINOPHIL NFR BLD AUTO: 3.2 %
GLUCOSE SERPL-MCNC: 91 MG/DL
HCT VFR BLD CALC: 41.8 %
HGB BLD-MCNC: 13.3 G/DL
IMM GRANULOCYTES NFR BLD AUTO: 0 %
LYMPHOCYTES # BLD AUTO: 1.37 K/UL
LYMPHOCYTES NFR BLD AUTO: 36.8 %
MAN DIFF?: NORMAL
MCHC RBC-ENTMCNC: 28.4 PG
MCHC RBC-ENTMCNC: 31.8 GM/DL
MCV RBC AUTO: 89.1 FL
MONOCYTES # BLD AUTO: 0.53 K/UL
MONOCYTES NFR BLD AUTO: 14.2 %
NEUTROPHILS # BLD AUTO: 1.63 K/UL
NEUTROPHILS NFR BLD AUTO: 43.9 %
NT-PROBNP SERPL-MCNC: 648 PG/ML
PLATELET # BLD AUTO: 352 K/UL
POTASSIUM SERPL-SCNC: 4.3 MMOL/L
PROT SERPL-MCNC: 6.6 G/DL
RBC # BLD: 4.69 M/UL
RBC # FLD: 13.2 %
SODIUM SERPL-SCNC: 141 MMOL/L
TSH SERPL-ACNC: 0.25 UIU/ML
WBC # FLD AUTO: 3.72 K/UL

## 2021-11-11 LAB — ACE BLD-CCNC: 35 U/L

## 2021-12-02 NOTE — PHYSICAL EXAM
[No Carotid Bruit] : no carotid bruit [Normal S1, S2] : normal S1, S2 [Normal] : no edema, no cyanosis, no clubbing, no varicosities [No Edema] : no edema [de-identified] : No JVD [de-identified] : Bradycardic, RRR

## 2021-12-02 NOTE — HISTORY OF PRESENT ILLNESS
[FreeTextEntry1] : Patient is a 66 yo Male with Pmhx of HTN (Echo with LVH), closely followed through the years by an outpatient cardiologist with reportedly normal stress Echos, who was recently Hospitalized at [facility] in April, where he presented with SOB and worsening NIELSEN, found to be in decompensated heart failure 2/2 NICM with biventricular failure (LVEF of 15%), Grade III diastolic dysfunction and Pulmonary HTN\par \par Prior to his hospitalization the patient had noted a decrease in exercise tolerance, easy fatigability, exertional angina,orthopnea and lower extremity edema. His SOB had prompted him to see a Pulmonologist without improvement in symptoms. Outpatient Echo obtained by his cardiologist revealed severely reduced EF with global hypokinesis as well as moderate AR and MR and  the patient was referred to the ER for further workup\par \par Patient was admitted to [facility] from 4/1-4/3. LHC revealed normal coronaries and RHC revealed elevated Left sided filling pressures with a PCWP of 23. RA was 3 and the Mean PA of 52 decreased to 42 after NO challenge.  He was diuresed with 40 iV BID of lasix ( output 4.8, net Neg 4.4 L)and started on GDMT with Carvedilol and entresto. NICM workup was initiated. Ct chest was not suggestive of any adenopathy/nodules or fibrosis as seen in sarcoidosis. SPEP was normal and the autoimmune panel only revealed a weakly positive CCP.\par \par The patient underwent outpatient MRI which showed various patterns of enhancement suggestive of either sarcoidosis or arrhythmogenic cardiomyopathy. The patient has seen HF twice since discharge and his entresto has been doubled in recent months. His lasix was also discontinued.\par \par Today the patient states that he feels well. He denies NIELSEN and is able to perform aerobic exercise a few times a week as well as resistance training. ROS is negative for orthopnea, platypnea, lower extremity edema. Patient has not gained weight and he is cognizant of his salt intake. \par

## 2021-12-02 NOTE — REVIEW OF SYSTEMS
[Negative] : Neurological [SOB] : no shortness of breath [Dyspnea on exertion] : not dyspnea during exertion [Chest Discomfort] : no chest discomfort [Lower Ext Edema] : no extremity edema [Leg Claudication] : no intermittent leg claudication [Palpitations] : no palpitations [Cough] : no cough

## 2021-12-02 NOTE — DISCUSSION/SUMMARY
[FreeTextEntry1] : 64 yo Male,former smoker with Pmhx of HTN, being seen for  ACC/AHA Stage C/NYHA class II 2/2 NICM with EF of 27% of Cardiac MR (April 2021), LViDs (5.92 cm)\par \par 1) ACC/AHA Stage C/NYHA class II 2/2 NICM with EF of 27% of Cardiac MR (April 2021), LViDs (5.92 cm)\par -Clinically patient is euvolemic and well compensated. He is able to perform his ADLS as well as aerobic exercises\par -He is Tolerating Entresto  PO BID, Carvedilol 12.5 po BID without any lightheadedness \par  or reported hypotension. His lasix has been discontinued by the HF team.\par -Cause of NICM remains unclear. Patient without history of alcohol abuse, thyroid dysfunction. Autoimmune workup revealed weakly positive CCP. In the setting of a negative KELSEY, this finding is non specific\par -Recent Cardiac MRI revealed various patterns of enhancement suggestive of either sarcoidosis or arrhythmogenic cardiomyopathy.\par -CT chest reviewed with radiologist without findings consisted of sarcoid. Additionally patient without any history of cutaneous sarcoid findings.\par -Will send ACE level (Though non specific), 1.25 vit D, Vit D 25, CMP\par -Arrhythmogenic cardiomyopathy seems unlikely as delayed enhancement seen is mostly noted on the LV\par -Amyloidosis differential previously discussed and remains a possibility despite normal SPEP. Will await consult with advanced HF prior to pursuing PET CT.\par -Echo performed today 11/9 showing EF of 15-20% despite OMT. Will refer to EP for ICD. \par -EKG today with LAFB with QRS of 130. As <150 patient likely not candidate for CRT therapy.\par -Referral for Advanced HF and EP\par -Pre-Op Risk assessment: Patient is scheduled to undergo a colonoscopy in December/January - He is an intermediate risk patient for a low risk procedure. There are no cardiac contraindications to this procedure. Would continue Coreg as scheduled on the day of the colonoscopy and would hold Entresto on the morning of the procedure, to be restarted that evening.\par -RTC in 3 months or sooner if clinically needed

## 2021-12-02 NOTE — CARDIOLOGY SUMMARY
[de-identified] : TTE (09/21): \par  1. Dilated left ventricular size.\par  2. Mild symmetric left ventricular hypertrophy.\par  3. Severely reduced left ventricular systolic function, LVEF 15-20%.\par  4. Grade I left ventricular diastolic dysfunction.\par  5. Normal right ventricular size and systolic function.\par  6. Moderately dilated left atrium.\par  7. Mild-to-moderate aortic regurgitation.\par  8. No evidence of pulmonary hypertension.\par  9. No pericardial effusion.\par 10. Aortic root is midly dilated measuring 3.80 cm.\par 11. Compared to the previous TTE performed on 4/1/2021, there has been interval resolution of pericardial effusion and right ventricular function appears normal.\par \par TTE: 4/21\par  1. Biatrial enlargement.\par  2. Mild-to-moderate aortic regurgitation.\par  3. There is mild mitral regurgitation. Severity might be slightly underestimated due to eccentricity.\par  4. There is mild tricuspid regurgitation. Pulmonary artery systolic pressure (estimated using the tricuspid regurgitant gradient and an estimate of right atrial pressure) is 33 mmHg.\par  5. The right ventricle is dilated. Right ventricular systolic function is reduced.\par  6. The left ventricle cavity size is dilated. There is mild concentric left ventricular hypertrophy. Left ventricular systolic function is severely reduced with a calculated ejection fraction of 15-20% with regional wall motion abnormalities. Mild to moderate hypokinesis of basal to mid inferolateral, basal anterolateral and basal anterior wall. Rest of the walls are severely hypokinetic to akinetic. Contrast swirling noted at the apex. No obvious thrombus noted. Grade III diastolic dysfunction.\par  7. Small pericardial effusion without echocardiographic evidence of cardiac tamponade physiology.\par  8. No prior echo is available for comparison.\par \par \par  [de-identified] : Cardiac MRI 4/21:  The left ventricle (LV) is dilated. There is  no evidence of LV hypertrophy . Mild to moderate hypokinesis of the basal inferolateral/basal anterolateral regions and moderate hypokinesis of the basal anterior region. Severe hypokinesis of the remaining regions. Left ventricular global systolic function is severely depressed. The LV ejection fraction is 21 %.\par 2.  Dilated left atrium.\par 3.  The right ventricle (RV) is dilated. RV global systolic function is mildly reduced. The RV ejection fraction is 49 %.\par 4.  By volumetrics and quantitative flow analysis, there is mild to moderate aortic regurgitation, moderate tricuspid regurgitation, mild pulmonic regurgitation, and mild mitral regurgitation. Correlate with echocardiogram.\par 5.  No significant abnormalities of the visualized portions of the great vessels.\par 6.  Small circumferential pericardial effusion.\par 7.  On delayed enhancement imaging,  there are various patterns of enhancement. There is midmyocardial enhancement in the basal to mid anteroseptum, apical septum, transmural in the mid to apical anterior, and subepicardial enhancement in the basal anterior/mid to apical inferior regions, and patchy in the true apex. Findings are consistent with a nonischemic etiology. Additionally, focal hyperenhancement noted in the basal to mid inferoseptal region at the RV insertion point which is nonspecific. Differential diagnoses to consider are sarcoidosis or genetic arrhythmogenic cardiomyopathy.\par  [de-identified] : 2021\par LHC: Normal Coronaries;\par \par RHC: RA 7, RV 65/7, PCWP 23, PA 87/37/52 (73/25/42 after Nitro), AoSat 84% RA, PA Sat 53% RA, CO 3.94, CI 2.08; DP PVR 7.4[place]s Units -\par

## 2022-01-26 ENCOUNTER — NON-APPOINTMENT (OUTPATIENT)
Age: 67
End: 2022-01-26

## 2022-01-26 ENCOUNTER — APPOINTMENT (OUTPATIENT)
Dept: HEART AND VASCULAR | Facility: CLINIC | Age: 67
End: 2022-01-26
Payer: MEDICARE

## 2022-01-26 VITALS
SYSTOLIC BLOOD PRESSURE: 131 MMHG | HEART RATE: 50 BPM | WEIGHT: 165.99 LBS | DIASTOLIC BLOOD PRESSURE: 70 MMHG | HEIGHT: 68 IN | BODY MASS INDEX: 25.16 KG/M2 | TEMPERATURE: 96.8 F

## 2022-01-26 PROCEDURE — 99215 OFFICE O/P EST HI 40 MIN: CPT

## 2022-01-26 PROCEDURE — 93000 ELECTROCARDIOGRAM COMPLETE: CPT

## 2022-01-26 NOTE — DISCUSSION/SUMMARY
[FreeTextEntry1] : # Chronic systolic heart failure\par - Etiology: C normal. Will obtain PET/CT to evaluate for sarcoid (pattern of LGE, conduction disease). Will refer to Dr. Varma for genetic testing. if all negative would suspect hypertensive heart disease \par - GDMT: current regimen is Entresto  mg BID, carvedilol 12.5 mg BID. Will start spironolactone 25 mg daily and Farxiga 10 mg daily. will uptitrate BB after DC-ICD\par - Diuretic: continue lasix 20 mg PRN \par - Device: multiple indications for DC-ICD for primary prevention and also to uptitrate beta blocker. I offered to make his appointment but he states he was given contact information at last visit with Dr. Vilchis (Dr. Donald?) and will contact that EP office himself \par - Labs: 11/2021 with K 4.3, Cr 1.0. repeat today before MRA \par \par # Combined pre and post-capillary pulmonary hypertension\par - follows with Dr Vilchis \par - suspect component of heart failure as well as COPD (VT with severe emphysematous changes) \par \par # HTN\par - controlling with GDMT as above \par \par Return to clinic in 3 months \par \par

## 2022-01-26 NOTE — ASSESSMENT
[FreeTextEntry1] : 65 YO M with a history of ACC/AHA Stage C NICM (LV 6.8 cm, LVEF 20%), severe combined pre and post-capillary pulmonary hypertension, and hypertension presenting to HF clinic for further management.\par \par Today he reports NYHA II symptoms and appears euvolemic on exam. He has room for uptitration of HF medical therapy. Conduction disease, pattern of LGE, and ethnicity raise concern for sarcoidosis. He has multiple indications for DC-ICD including LVEFE < 35% after > 3 months GDMT, need for atrial lead to uptitrate beta blocker, and LGE which increases risk of SCD.

## 2022-01-26 NOTE — HISTORY OF PRESENT ILLNESS
[FreeTextEntry1] : Cardiologist: Dr. Solorzano\par PH: Dr. Vilchis \par \par Mr. Gillis is a 65 YO M with a history of ACC/AHA Stage C NICM (LV 6.8 cm, LVEF 20%), severe combined pre and post-capillary pulmonary hypertension, and hypertension presenting to HF clinic for further management.\par \par He previously had LVEF 45-50% and followed with Dr. Solorzano. He noted to develop worsening dyspnea on exertion with TTE 4/2021 revealing severe LV dysfunction. He underwent R/LHC which revealed normal coronary arteries, and severe cpcPH with borderline cardiac output. Subsequent cMRI revealed patchy LGE. He has tolerated uptitration of GDMT but remains with severe LV dysfunction. He does not yet have an ICD. He has not had repeat hospitalizations. \par \par He presents today for consultation. He feels well overall. He recently had some dietary indiscretion and developed some bloating and dyspnea for which he took some doses of lasix which resolved his symptoms. He has been exercising by using a treadmill and exercise bands. He states that his blood pressure while exercising will decrease to as low as 99/77. He denies overt dyspnea on exertion but has not pushed himself as much recently. He goes at 3.5 on the treadmill for about 30 minutes. He believes he could do 4-5 flights of stairs without stopping. Denies orthopnea or lower extremity edema. Notes occasional dizziness when standing quickly after exercising. Denies syncope. Denies any palpitations.

## 2022-01-26 NOTE — CARDIOLOGY SUMMARY
[de-identified] : \par EKG 1/2022: SB with first degree AVB (), IVCD in the left bundle with  ms, LAFB, lateral T wave inversions\par EKG 9/2021: SB, IVCD, LAFB, lateral T wave inversions [de-identified] : \par TTE 11/2021: LV 6.8cm, LVEF 20%, LVOT VTI 17 cm, mild concentric LVH, normal RV size/function, mild-moderate AI, PASP not measured\par \par TTE 4/2021: LVEF 15-20%\par \par \par  [de-identified] : \par Cardiac MRI 4/2021: LVEF 21%, no significant LVH, RVEF 49%, patchy mesocardial/transmural/subepicardial LGE throughout suggestive of NICM including sarcoidosis or genetic cardiomyopathy\par \par CT Chest 4/2021: severe emphesymatous changes\par \par  [de-identified] : \par [facility] 4/2021: normal coronary arteries\par [facility] 4/2021: RA 7, RV 65/7, PCWP 23, PA 87/37/52 (73/25/42 after Nitro), AoSat 84% RA, PA Sat 53% RA, CO 3.94, CI 2.08. PVR 7.4\par

## 2022-01-26 NOTE — PHYSICAL EXAM
[Well Developed] : well developed [Well Nourished] : well nourished [Normal Venous Pressure] : normal venous pressure [Clear Lung Fields] : clear lung fields [Good Air Entry] : good air entry [Soft] : abdomen soft [Non Tender] : non-tender [Normal Gait] : normal gait [Moves all extremities] : moves all extremities [Alert and Oriented] : alert and oriented [Normal memory] : normal memory [de-identified] : Bradycardic, no murmurs or gallops  [de-identified] : Noris, no edema

## 2022-01-27 LAB
ALBUMIN SERPL ELPH-MCNC: 4.5 G/DL
ALP BLD-CCNC: 103 U/L
ALT SERPL-CCNC: 13 U/L
ANION GAP SERPL CALC-SCNC: 14 MMOL/L
AST SERPL-CCNC: 17 U/L
BILIRUB SERPL-MCNC: 0.7 MG/DL
BUN SERPL-MCNC: 13 MG/DL
CALCIUM SERPL-MCNC: 10.2 MG/DL
CHLORIDE SERPL-SCNC: 104 MMOL/L
CO2 SERPL-SCNC: 24 MMOL/L
CREAT SERPL-MCNC: 1.03 MG/DL
GLUCOSE SERPL-MCNC: 89 MG/DL
NT-PROBNP SERPL-MCNC: 770 PG/ML
POTASSIUM SERPL-SCNC: 4.7 MMOL/L
PROT SERPL-MCNC: 7.3 G/DL
SODIUM SERPL-SCNC: 143 MMOL/L

## 2022-05-24 ENCOUNTER — RX RENEWAL (OUTPATIENT)
Age: 67
End: 2022-05-24

## 2022-06-15 ENCOUNTER — APPOINTMENT (OUTPATIENT)
Dept: HEART AND VASCULAR | Facility: CLINIC | Age: 67
End: 2022-06-15

## 2022-06-15 VITALS
OXYGEN SATURATION: 99 % | DIASTOLIC BLOOD PRESSURE: 59 MMHG | WEIGHT: 170 LBS | HEIGHT: 68 IN | TEMPERATURE: 208.22 F | BODY MASS INDEX: 25.76 KG/M2 | HEART RATE: 65 BPM | SYSTOLIC BLOOD PRESSURE: 101 MMHG

## 2022-06-15 PROCEDURE — 99214 OFFICE O/P EST MOD 30 MIN: CPT

## 2022-06-15 RX ORDER — FUROSEMIDE 20 MG/1
20 TABLET ORAL
Qty: 30 | Refills: 3 | Status: DISCONTINUED | COMMUNITY
Start: 2022-01-26 | End: 2022-06-15

## 2022-06-15 NOTE — DISCUSSION/SUMMARY
[FreeTextEntry1] : # Chronic systolic heart failure\par - Etiology: LHC normal. Will obtain PET/CT to evaluate for sarcoid (pattern of LGE, conduction disease) which was ordered last visit - I will reach out to radiology department. He wants to defer genetic testing until next visit. if all negative would suspect hypertensive heart disease \par - GDMT: current regimen is Entresto  mg BID, carvedilol 12.5 mg BID, spironolactone 25 mg daily, and Farxiga 10 mg daily. will uptitrate BB after DC-ICD\par - Diuretic: euvolemic \par - Device: multiple indications for DC-ICD for primary prevention and also to uptitrate beta blocker. I offered to make his appointment last visit but he states he was going to contact himself. I have now referred to Dr. Koch for ICD. \par - Last TTE 11/2021, repeat ~11/2022. \par - Labs: 1/2022 with K 4.7 and Cr 1.0, will repeat today \par \par # Combined pre and post-capillary pulmonary hypertension\par - follows with Dr Vilchis \par - suspect component of heart failure as well as COPD (CT with severe emphysematous changes) \par \par # HTN\par - controlling with GDMT as above \par \par Return to clinic in 3 months \par \par

## 2022-06-15 NOTE — CARDIOLOGY SUMMARY
[de-identified] : \par EKG 1/2022: SB with first degree AVB (), IVCD in the left bundle with  ms, LAFB, lateral T wave inversions\par EKG 9/2021: SB, IVCD, LAFB, lateral T wave inversions [de-identified] : \par TTE 11/2021: LV 6.8cm, LVEF 20%, LVOT VTI 17 cm, mild concentric LVH, normal RV size/function, mild-moderate AI, PASP not measured\par \par TTE 4/2021: LVEF 15-20%\par \par \par  [de-identified] : \par Cardiac MRI 4/2021: LVEF 21%, no significant LVH, RVEF 49%, patchy mesocardial/transmural/subepicardial LGE throughout suggestive of NICM including sarcoidosis or genetic cardiomyopathy\par \par CT Chest 4/2021: severe emphesymatous changes\par \par  [de-identified] : \par [facility] 4/2021: normal coronary arteries\par [facility] 4/2021: RA 7, RV 65/7, PCWP 23, PA 87/37/52 (73/25/42 after Nitro), AoSat 84% RA, PA Sat 53% RA, CO 3.94, CI 2.08. PVR 7.4\par

## 2022-06-15 NOTE — HISTORY OF PRESENT ILLNESS
[FreeTextEntry1] : Cardiologist: Dr. Solorzano\par PH: Dr. Vilchis \par \par Mr. Gillis is a 67 YO M with a history of ACC/AHA Stage C NICM (LV 6.8 cm, LVEF 20%), severe combined pre and post-capillary pulmonary hypertension, and hypertension presenting to HF clinic for further management.\par \par He previously had LVEF 45-50% and followed with Dr. Solorzano. He noted to develop worsening dyspnea on exertion with TTE 4/2021 revealing severe LV dysfunction. He underwent R/LHC which revealed normal coronary arteries, and severe cpcPH with borderline cardiac output. Subsequent cMRI revealed patchy LGE. He has tolerated uptitration of GDMT but remains with severe LV dysfunction. He does not yet have an ICD but has been referred to EP multiple times. He has not had repeat hospitalizations. \par \par He presents today for followup. Since last visit he feels the same. He is still going 3.5 on the treadmill for 30 minutes at a time. He can climb 4-5 flights of stairs before stopping. Denies dyspnea during household activities. He notes occasional dizziness/LH after bending over and standing up. Denies orthopnea or lower extremity edema. Denies syncope. Denies palpitations. Rates his QOL as a 9/10 at this time. He says sometimes he forgets he has a cardiac condition. \par \par

## 2022-06-15 NOTE — PHYSICAL EXAM
[Well Developed] : well developed [Well Nourished] : well nourished [Normal Venous Pressure] : normal venous pressure [Clear Lung Fields] : clear lung fields [Good Air Entry] : good air entry [Soft] : abdomen soft [Non Tender] : non-tender [Normal Gait] : normal gait [Moves all extremities] : moves all extremities [Alert and Oriented] : alert and oriented [Normal memory] : normal memory [de-identified] : Bradycardic, no murmurs or gallops  [de-identified] : Noris, no edema

## 2022-06-16 LAB
ALBUMIN SERPL ELPH-MCNC: 4.3 G/DL
ALP BLD-CCNC: 97 U/L
ALT SERPL-CCNC: 11 U/L
ANION GAP SERPL CALC-SCNC: 11 MMOL/L
AST SERPL-CCNC: 16 U/L
BILIRUB SERPL-MCNC: 0.3 MG/DL
BUN SERPL-MCNC: 23 MG/DL
CALCIUM SERPL-MCNC: 10.1 MG/DL
CHLORIDE SERPL-SCNC: 104 MMOL/L
CO2 SERPL-SCNC: 26 MMOL/L
CREAT SERPL-MCNC: 1.11 MG/DL
EGFR: 72 ML/MIN/1.73M2
GLUCOSE SERPL-MCNC: 89 MG/DL
NT-PROBNP SERPL-MCNC: 258 PG/ML
POTASSIUM SERPL-SCNC: 5 MMOL/L
PROT SERPL-MCNC: 7.3 G/DL
SODIUM SERPL-SCNC: 141 MMOL/L

## 2022-06-22 ENCOUNTER — APPOINTMENT (OUTPATIENT)
Dept: HEART AND VASCULAR | Facility: CLINIC | Age: 67
End: 2022-06-22

## 2022-06-22 VITALS
SYSTOLIC BLOOD PRESSURE: 126 MMHG | OXYGEN SATURATION: 98 % | BODY MASS INDEX: 25.76 KG/M2 | DIASTOLIC BLOOD PRESSURE: 73 MMHG | HEIGHT: 68 IN | WEIGHT: 170 LBS | HEART RATE: 54 BPM

## 2022-06-22 DIAGNOSIS — I42.8 OTHER CARDIOMYOPATHIES: ICD-10-CM

## 2022-06-22 PROCEDURE — ZZZZZ: CPT

## 2022-06-30 PROBLEM — I42.8 NICM (NONISCHEMIC CARDIOMYOPATHY): Status: ACTIVE | Noted: 2021-04-13

## 2022-07-12 NOTE — REASON FOR VISIT
[Symptom and Test Evaluation] : symptom and test evaluation [Arrhythmia/ECG Abnorrmalities] : arrhythmia/ECG abnormalities [FreeTextEntry1] : Mr. 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 \par \par He previously had LVEF 45-50% and followed with Dr. Solorzano. He noted to develop worsening dyspnea on exertion with TTE 4/2021 revealing severe LV dysfunction. He underwent R/LHC which revealed normal coronary arteries, and severe cpcPH with borderline cardiac output. Subsequent cMRI revealed patchy LGE. He has tolerated uptitration of GDMT but remains with severe LV dysfunction. He does not yet have an ICD but has been referred to EP multiple times. He has not had repeat hospitalizations. \par \par

## 2022-10-03 ENCOUNTER — RX RENEWAL (OUTPATIENT)
Age: 67
End: 2022-10-03

## 2022-10-10 ENCOUNTER — RX RENEWAL (OUTPATIENT)
Age: 67
End: 2022-10-10

## 2022-11-14 ENCOUNTER — NON-APPOINTMENT (OUTPATIENT)
Age: 67
End: 2022-11-14

## 2022-11-14 ENCOUNTER — APPOINTMENT (OUTPATIENT)
Dept: HEART AND VASCULAR | Facility: CLINIC | Age: 67
End: 2022-11-14

## 2022-11-14 VITALS
HEIGHT: 68 IN | TEMPERATURE: 207.5 F | WEIGHT: 183 LBS | BODY MASS INDEX: 27.74 KG/M2 | DIASTOLIC BLOOD PRESSURE: 62 MMHG | SYSTOLIC BLOOD PRESSURE: 112 MMHG | OXYGEN SATURATION: 99 % | HEART RATE: 60 BPM

## 2022-11-14 PROCEDURE — 99214 OFFICE O/P EST MOD 30 MIN: CPT

## 2022-11-14 NOTE — HISTORY OF PRESENT ILLNESS
[FreeTextEntry1] : Cardiologist: Dr. Solorzano\par PH: Dr. Vilchis \par \par Mr. 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 Solorzano. He noted to develop worsening dyspnea on exertion with TTE 4/2021 revealing severe LV dysfunction. He underwent R/LHC which revealed normal coronary arteries, and severe cpcPH with borderline cardiac output. Subsequent cMRI revealed patchy LGE. He has tolerated uptitration of GDMT but remains with severe LV dysfunction. He does not yet have an ICD but has been referred to EP. He has not had repeat hospitalizations and has had stable NYHA II symptoms with good quality of life\par \par He presents today for followup.Since last visit he feels the same. He is still going 3.5 on the treadmill for 30 minutes at a time. He can climb 4-5 flights of stairs before stopping. Denies dyspnea during household activities. He notes occasional dizziness/LH after bending over and standing up. Denies orthopnea or lower extremity edema. Denies syncope. Denies palpitations. Rates his QOL as a 9/10 at this time, same as last visit. \par \par

## 2022-11-14 NOTE — CARDIOLOGY SUMMARY
[de-identified] : \par EKG 1/2022: SB with first degree AVB (), IVCD in the left bundle with  ms, LAFB, lateral T wave inversions\par EKG 9/2021: SB, IVCD, LAFB, lateral T wave inversions [de-identified] : \par TTE 11/2021: LV 6.8cm, LVEF 20%, LVOT VTI 17 cm, mild concentric LVH, normal RV size/function, mild-moderate AI, PASP not measured\par \par TTE 4/2021: LVEF 15-20%\par \par \par  [de-identified] : \par Cardiac MRI 4/2021: LVEF 21%, no significant LVH, RVEF 49%, patchy mesocardial/transmural/subepicardial LGE throughout suggestive of NICM including sarcoidosis or genetic cardiomyopathy\par \par CT Chest 4/2021: severe emphesymatous changes\par \par  [de-identified] : \par [facility] 4/2021: normal coronary arteries\par [facility] 4/2021: RA 7, RV 65/7, PCWP 23, PA 87/37/52 (73/25/42 after Nitro), AoSat 84% RA, PA Sat 53% RA, CO 3.94, CI 2.08. PVR 7.4\par

## 2022-11-14 NOTE — DISCUSSION/SUMMARY
[FreeTextEntry1] : # Chronic systolic heart failure\par - Etiology: LHC normal. Will obtain PET/CT to evaluate for sarcoid (pattern of LGE, conduction disease) which was ordered last visit, previously scheduled but didn't occur - I discussed again with admins. If negative then likely hypertensive heart disease \par - GDMT: current regimen is Entresto  mg BID, carvedilol 12.5 mg BID, spironolactone 25 mg daily, and Farxiga 10 mg daily. will uptitrate BB after DC-ICD if amenable \par - Diuretic: euvolemic \par - Device: multiple indications for DC-ICD for primary prevention and also to uptitrate beta blocker. he has seen Dr. Koch but patient not ready yet and will contact when amenable. I will repeat TTE to help guide decision making with patient but I again reiterated importance of a device regardless\par - Repeat TTE in next 2 weeks \par - Labs: 6/20922 with K 5.0 and Cr 1.1, will repeat today and will consider decreasing spironolactone if K still high \par \par # Combined pre and post-capillary pulmonary hypertension\par - follows with Dr Vilchis \par - suspect component of heart failure as well as COPD (CT with severe emphysematous changes) \par - will reassess PA pressures on TTE \par \par # HTN\par - controlling with GDMT as above \par \par Return to clinic in 4 months, TTE before that visit in the next 2 weeks \par \par

## 2022-11-14 NOTE — PHYSICAL EXAM
[Well Developed] : well developed [Well Nourished] : well nourished [Normal Venous Pressure] : normal venous pressure [Clear Lung Fields] : clear lung fields [Good Air Entry] : good air entry [Soft] : abdomen soft [Non Tender] : non-tender [Normal Gait] : normal gait [Moves all extremities] : moves all extremities [Alert and Oriented] : alert and oriented [Normal memory] : normal memory [de-identified] : RRR, no murmurs or gallops  [de-identified] : Warm, no edema

## 2022-11-14 NOTE — ASSESSMENT
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 His BNP has reassuringly normalized and he is tolerating good doses of HF medical therapy. \par \par Conduction disease, pattern of LGE, and ethnicity raise concern for sarcoidosis. He has multiple indications for DC-ICD including LVEF < 35% after > 3 months GDMT, need for atrial lead to uptitrate beta blocker, and LGE which increases risk of SCD.

## 2022-11-17 ENCOUNTER — TRANSCRIPTION ENCOUNTER (OUTPATIENT)
Age: 67
End: 2022-11-17

## 2022-11-17 LAB
ALBUMIN SERPL ELPH-MCNC: 4.1 G/DL
ALP BLD-CCNC: 96 U/L
ALT SERPL-CCNC: 12 U/L
ANION GAP SERPL CALC-SCNC: 14 MMOL/L
AST SERPL-CCNC: 16 U/L
BILIRUB SERPL-MCNC: 0.3 MG/DL
BUN SERPL-MCNC: 20 MG/DL
CALCIUM SERPL-MCNC: 9.4 MG/DL
CHLORIDE SERPL-SCNC: 105 MMOL/L
CO2 SERPL-SCNC: 21 MMOL/L
CREAT SERPL-MCNC: 1.24 MG/DL
EGFR: 63 ML/MIN/1.73M2
GLUCOSE SERPL-MCNC: 93 MG/DL
NT-PROBNP SERPL-MCNC: 625 PG/ML
POTASSIUM SERPL-SCNC: 4.5 MMOL/L
PROT SERPL-MCNC: 6.9 G/DL
SODIUM SERPL-SCNC: 140 MMOL/L

## 2022-11-29 ENCOUNTER — APPOINTMENT (OUTPATIENT)
Dept: HEART AND VASCULAR | Facility: CLINIC | Age: 67
End: 2022-11-29

## 2022-11-29 VITALS
BODY MASS INDEX: 27.43 KG/M2 | TEMPERATURE: 208.4 F | SYSTOLIC BLOOD PRESSURE: 110 MMHG | WEIGHT: 181 LBS | OXYGEN SATURATION: 99 % | HEIGHT: 68 IN | HEART RATE: 58 BPM | DIASTOLIC BLOOD PRESSURE: 60 MMHG

## 2022-11-29 PROCEDURE — 93306 TTE W/DOPPLER COMPLETE: CPT

## 2022-12-07 ENCOUNTER — NON-APPOINTMENT (OUTPATIENT)
Age: 67
End: 2022-12-07

## 2022-12-12 ENCOUNTER — OUTPATIENT (OUTPATIENT)
Dept: OUTPATIENT SERVICES | Facility: HOSPITAL | Age: 67
LOS: 1 days | End: 2022-12-12
Payer: MEDICARE

## 2022-12-12 ENCOUNTER — APPOINTMENT (OUTPATIENT)
Dept: NUCLEAR MEDICINE | Facility: HOSPITAL | Age: 67
End: 2022-12-12

## 2022-12-12 ENCOUNTER — RESULT REVIEW (OUTPATIENT)
Age: 67
End: 2022-12-12

## 2022-12-12 LAB — GLUCOSE BLDC GLUCOMTR-MCNC: 82 MG/DL — SIGNIFICANT CHANGE UP (ref 70–99)

## 2022-12-12 PROCEDURE — 82962 GLUCOSE BLOOD TEST: CPT

## 2022-12-12 PROCEDURE — 78451 HT MUSCLE IMAGE SPECT SING: CPT | Mod: 26,MH

## 2022-12-12 PROCEDURE — 78429 MYOCRD IMG PET 1 STD W/CT: CPT | Mod: 26

## 2022-12-12 PROCEDURE — A9500: CPT

## 2022-12-12 PROCEDURE — 78451 HT MUSCLE IMAGE SPECT SING: CPT

## 2022-12-12 PROCEDURE — 78429 MYOCRD IMG PET 1 STD W/CT: CPT

## 2023-03-03 RX ORDER — PREDNISONE 5 MG/1
5 TABLET ORAL
Qty: 28 | Refills: 0 | Status: DISCONTINUED | COMMUNITY
Start: 2022-12-21 | End: 2023-03-03

## 2023-03-03 RX ORDER — PREDNISONE 20 MG/1
20 TABLET ORAL TWICE DAILY
Qty: 60 | Refills: 1 | Status: DISCONTINUED | COMMUNITY
Start: 2022-12-21 | End: 2023-03-03

## 2023-03-03 RX ORDER — PREDNISONE 10 MG/1
10 TABLET ORAL
Qty: 28 | Refills: 0 | Status: DISCONTINUED | COMMUNITY
Start: 2022-12-21 | End: 2023-03-03

## 2023-05-17 NOTE — PATIENT PROFILE ADULT - SURGICAL SITE INCISION
yes Zygomaticofacial Flap Text: Given the location of the defect, shape of the defect and the proximity to free margins a zygomaticofacial flap was deemed most appropriate for repair.  Using a sterile surgical marker, the appropriate flap was drawn incorporating the defect and placing the expected incisions within the relaxed skin tension lines where possible. The area thus outlined was incised deep to adipose tissue with a #15 scalpel blade with preservation of a vascular pedicle.  The skin margins were undermined to an appropriate distance in all directions utilizing iris scissors.  The flap was then placed into the defect and anchored with interrupted buried subcutaneous sutures.

## 2023-06-19 ENCOUNTER — APPOINTMENT (OUTPATIENT)
Dept: HEART AND VASCULAR | Facility: CLINIC | Age: 68
End: 2023-06-19
Payer: MEDICARE

## 2023-06-19 ENCOUNTER — NON-APPOINTMENT (OUTPATIENT)
Age: 68
End: 2023-06-19

## 2023-06-19 VITALS
TEMPERATURE: 207.68 F | DIASTOLIC BLOOD PRESSURE: 70 MMHG | HEART RATE: 55 BPM | BODY MASS INDEX: 26.82 KG/M2 | WEIGHT: 176.99 LBS | HEIGHT: 68 IN | OXYGEN SATURATION: 99 % | SYSTOLIC BLOOD PRESSURE: 104 MMHG

## 2023-06-19 PROCEDURE — 99214 OFFICE O/P EST MOD 30 MIN: CPT

## 2023-06-19 PROCEDURE — 93000 ELECTROCARDIOGRAM COMPLETE: CPT

## 2023-06-19 RX ORDER — PREDNISONE 10 MG/1
10 TABLET ORAL
Qty: 28 | Refills: 0 | Status: DISCONTINUED | COMMUNITY
Start: 2022-12-21 | End: 2023-06-19

## 2023-06-19 RX ORDER — PREDNISONE 5 MG/1
5 TABLET ORAL TWICE DAILY
Qty: 28 | Refills: 0 | Status: DISCONTINUED | COMMUNITY
Start: 2022-12-21 | End: 2023-06-19

## 2023-06-19 NOTE — DISCUSSION/SUMMARY
[FreeTextEntry1] : # Chronic systolic heart failure\par - Etiology: due to cardiac sarcoidosis, mnanagement as below \par - GDMT: current regimen is Entresto  mg BID, carvedilol 12.5 mg BID, spironolactone 25 mg daily, and Farxiga 10 mg daily. unable to uptitrate BB without device given HR < 60 \par - Diuretic: euvolemic on PRN lasix 20 mg daily \par - Device: multiple indications for DC-ICD for primary prevention (in particular, sarcoidosis increases VT risk) and also to uptitrate beta blocker. he has seen Dr. Koch but patient not ready yet and will contact when amenable. I again discussed risks of SCD, he will reconsider after repeat TTE. \par - Repeat TTE 11/2023\par - Labs: repeat today\par \par # Cardiac sarcoidosis\par - Current prednisone dose is 5 mg daily\par - Will repeat PET/CT at one year (~12/2023) to determine if prednisone can be further weaned or if additional agent should be added \par - No evidence of extracardiac sarcoidosis \par \par # Combined pre and post-capillary pulmonary hypertension\par - follows with Dr Vilchis \par - suspect component of heart failure and sarcoidosis as well as COPD (CT with severe emphysematous changes) \par - will reassess PA pressures on TTE \par \par # HTN\par - controlling with GDMT as above \par \par Return to clinic in 6 months (TTE in 5 months). Will order repeat PET at this visit \par \par

## 2023-06-19 NOTE — ASSESSMENT
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 His BNP has reassuringly normalized and he is tolerating good doses of HF medical therapy. \par \par He was found to have sarcoidosis and started on prednisone. Will repeat TTE/PET to assess for interval improvement. \par

## 2023-06-19 NOTE — HISTORY OF PRESENT ILLNESS
[FreeTextEntry1] : Cardiologist: Dr. Solorzano\par \par Mr. 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 Solorzano. He noted to develop worsening dyspnea on exertion with TTE 4/2021 revealing severe LV dysfunction. He underwent R/LHC which revealed normal coronary arteries, and severe cpcPH with borderline cardiac output. Subsequent cMRI revealed patchy LGE. He has tolerated uptitration of GDMT but remains with severe LV dysfunction. He does not yet have an ICD but has been referred to EP and not amenable. He has not had repeat hospitalizations and has had stable nonlimiting NYHA II symptoms with good quality of life.\par \par I performed a PET/CT 12/2022 which was suggestive of sarcoidosis. He was started on high dose prednisone with a slow taper to 5 mg after this diagnosis. \par \par He presents today for followup. He overall feels the same since last visit. He is still going 3.5-4.0 on the treadmill for 30 minutes at a time. He can climb 4-5 flights of stairs before stopping. Denies dyspnea during household activities. He notes some volume retention with increased fluid intake that resolved with PRN diuretics. Notes occasional dizziness when standing up quickly. Denies orthopnea or lower extremity edema. Denies syncope. Denies palpitations. Rates his QOL as a 9/10 at this time, same as last visit. \par \par With regards to prednisone use, he denies fevers, chills, appetite changes, mood changes. He notes minor increased bruising. He has some increased facial adiposity which is improving. \par \par

## 2023-06-19 NOTE — PHYSICAL EXAM
[Well Developed] : well developed [Well Nourished] : well nourished [Normal Venous Pressure] : normal venous pressure [Clear Lung Fields] : clear lung fields [Good Air Entry] : good air entry [Soft] : abdomen soft [Non Tender] : non-tender [Normal Gait] : normal gait [Moves all extremities] : moves all extremities [Alert and Oriented] : alert and oriented [Normal memory] : normal memory [de-identified] : RRR, no murmurs or gallops  [de-identified] : Warm, no edema

## 2023-06-19 NOTE — CARDIOLOGY SUMMARY
[de-identified] : \par EKG 6/2023: SB with first degree AVB (), IVCD in LBBB with  ms \par EKG 1/2022: SB with first degree AVB (), IVCD in the left bundle with  ms, LAFB, lateral T wave inversions\par EKG 9/2021: SB, IVCD, LAFB, lateral T wave inversions [de-identified] : \par TTE 11/2021: LV 6.8cm, LVEF 20%, LVOT VTI 17 cm, mild concentric LVH, normal RV size/function, mild-moderate AI, PASP not measured\par \par TTE 4/2021: LVEF 15-20%\par \par \par  [de-identified] : \par Cardiac MRI 4/2021: LVEF 21%, no significant LVH, RVEF 49%, patchy mesocardial/transmural/subepicardial LGE throughout suggestive of NICM including sarcoidosis or genetic cardiomyopathy\par \par CT Chest 4/2021: severe emphesymatous changes\par \par  [de-identified] : \par PET/CT 12/2022: heterogeneous FDG uptake corresponding to abnormal areas of LGE on cMRI consistent with cardiac sarcoidosis\par  [de-identified] : \par [facility] 4/2021: normal coronary arteries\par [facility] 4/2021: RA 7, RV 65/7, PCWP 23, PA 87/37/52 (73/25/42 after Nitro), AoSat 84% RA, PA Sat 53% RA, CO 3.94, CI 2.08. PVR 7.4\par

## 2023-06-20 LAB
ALBUMIN SERPL ELPH-MCNC: 4.6 G/DL
ALP BLD-CCNC: 76 U/L
ALT SERPL-CCNC: 16 U/L
ANION GAP SERPL CALC-SCNC: 15 MMOL/L
AST SERPL-CCNC: 18 U/L
BILIRUB SERPL-MCNC: 0.6 MG/DL
BUN SERPL-MCNC: 17 MG/DL
CALCIUM SERPL-MCNC: 10 MG/DL
CHLORIDE SERPL-SCNC: 103 MMOL/L
CHOLEST SERPL-MCNC: 228 MG/DL
CO2 SERPL-SCNC: 23 MMOL/L
CREAT SERPL-MCNC: 1 MG/DL
EGFR: 81 ML/MIN/1.73M2
GLUCOSE SERPL-MCNC: 91 MG/DL
HDLC SERPL-MCNC: 64 MG/DL
LDLC SERPL CALC-MCNC: 141 MG/DL
NONHDLC SERPL-MCNC: 163 MG/DL
NT-PROBNP SERPL-MCNC: 400 PG/ML
POTASSIUM SERPL-SCNC: 4.5 MMOL/L
PROT SERPL-MCNC: 7.1 G/DL
SODIUM SERPL-SCNC: 142 MMOL/L
TRIGL SERPL-MCNC: 111 MG/DL

## 2023-09-12 ENCOUNTER — RX RENEWAL (OUTPATIENT)
Age: 68
End: 2023-09-12

## 2023-10-12 ENCOUNTER — RX RENEWAL (OUTPATIENT)
Age: 68
End: 2023-10-12

## 2023-11-01 ENCOUNTER — TRANSCRIPTION ENCOUNTER (OUTPATIENT)
Age: 68
End: 2023-11-01

## 2023-11-01 ENCOUNTER — APPOINTMENT (OUTPATIENT)
Dept: HEART AND VASCULAR | Facility: CLINIC | Age: 68
End: 2023-11-01
Payer: MEDICARE

## 2023-11-01 PROCEDURE — 93306 TTE W/DOPPLER COMPLETE: CPT

## 2023-11-20 ENCOUNTER — APPOINTMENT (OUTPATIENT)
Dept: NUCLEAR MEDICINE | Facility: HOSPITAL | Age: 68
End: 2023-11-20
Payer: MEDICARE

## 2023-11-20 ENCOUNTER — OUTPATIENT (OUTPATIENT)
Dept: OUTPATIENT SERVICES | Facility: HOSPITAL | Age: 68
LOS: 1 days | End: 2023-11-20
Payer: MEDICARE

## 2023-11-20 ENCOUNTER — RESULT REVIEW (OUTPATIENT)
Age: 68
End: 2023-11-20

## 2023-11-20 LAB
GLUCOSE BLDC GLUCOMTR-MCNC: 73 MG/DL — SIGNIFICANT CHANGE UP (ref 70–99)
GLUCOSE BLDC GLUCOMTR-MCNC: 73 MG/DL — SIGNIFICANT CHANGE UP (ref 70–99)

## 2023-11-20 PROCEDURE — 78451 HT MUSCLE IMAGE SPECT SING: CPT

## 2023-11-20 PROCEDURE — 78429 MYOCRD IMG PET 1 STD W/CT: CPT | Mod: MH

## 2023-11-20 PROCEDURE — 82962 GLUCOSE BLOOD TEST: CPT

## 2023-11-20 PROCEDURE — 78451 HT MUSCLE IMAGE SPECT SING: CPT | Mod: 26,MH

## 2023-11-20 PROCEDURE — 78429 MYOCRD IMG PET 1 STD W/CT: CPT | Mod: 26,MH

## 2023-11-20 PROCEDURE — A9500: CPT

## 2023-11-24 ENCOUNTER — NON-APPOINTMENT (OUTPATIENT)
Age: 68
End: 2023-11-24

## 2023-12-11 NOTE — PATIENT PROFILE ADULT - DO YOU NEED ADDITIONAL SERVICES TO MANAGE ANY OF THESE MEDICAL CONDITIONS AT HOME?
TRAVEL COUNSELING OFFICE VISIT     Traveler Information:   Shilo Aguilera is a 67 year old male, who is interviewed and counseled regarding his travel plans as described below.     Travel to:  [place] and [place]    Date of departure:  01/03/2024   Duration of time in country:  01/18/2024  Reason for travel:  He will be traveling as tourist     Prior Travel Experience: [place], [place], [place], [place], [place], [place], [place], [place], [place], [place], [place]      Travel Risk Information:   He will be visiting rural areas.  He will be visiting usual tourist areas.    No current outpatient medications on file.     No current facility-administered medications for this visit.       He does not use antacids regularly.  Shilo is not allergic to eggs, neomycin, streptomycin, latex, bees.    Past Medical History:    He has no history of immune deficiency or prior vaccine reactions.  He has no history of any psychiatric disorder.    RECOMMENDATIONS:  Vaccinations:  1. Hepatitis A - First dose given with primary prior to clinic appointment  2. Hepatitis B - deferred  3. Typhoid - Oral Typhoid given, will need a booster in 5 years (given by PCP and patient will start on Sunday, there is no interaction with yellow fever vaccine)  4. Yellow fever - Yellow Fever vaccine given in clinic  5. Tdap - Completed prior to appointment  6. COVID-19 - Completed prior to appointment  7. Other vaccinations - Reviewed  8. Routine vaccinations - Reviewed    Malaria prophylaxis - Malarone 250/100mg 1 tablet daily starting 2 days prior to travel and continuing for 7 days after leaving malarious area (given by PCP)    Traveler's diarrhea - Z-malinda take as prescribed    Labs - Not required      Safety/Security:   Shilo was given the [employer] report detailing safety and security issues for his travel.  [place] Consular location and contact information was also provided to the patient.     Assessment:  Travel Counseling  More than half of the visit  was spent providing counseling regarding travel.  Face-to-face counseling time was at least 45 minutes.     Visit / Order Summary:    Diagnoses and all orders for this visit:    Need for immunization against yellow fever  -     YELLOW FEVER LIVE VACC, SQ    Traveler's diarrhea  -     azithromycin (ZITHROMAX) 500 MG tablet; Take 1 tablet by mouth daily as needed (Traveler diarrhea).    Travel advice encounter      Follow-up:   Shilo is to follow up to complete any vaccine series requirements, and was advised to call the clinic with any questions or concerns before or after travel.    I also discussed with the patient regarding appropriate hand hygiene, use of mosquito repellents, mosquito nets, basic preventative measures including safe water and food, blood borne sexually transmitted disease, safety and crime avoidance, safety in the hotels and a pre-travel checklist including having appropriate medications like sunscreen lotions, hat for protection, and pre-departure medical and dental exams required by the primary doctor.      yes

## 2023-12-18 ENCOUNTER — APPOINTMENT (OUTPATIENT)
Dept: HEART AND VASCULAR | Facility: CLINIC | Age: 68
End: 2023-12-18
Payer: MEDICARE

## 2023-12-18 VITALS
OXYGEN SATURATION: 99 % | DIASTOLIC BLOOD PRESSURE: 60 MMHG | SYSTOLIC BLOOD PRESSURE: 108 MMHG | HEIGHT: 68 IN | BODY MASS INDEX: 25.76 KG/M2 | HEART RATE: 62 BPM | WEIGHT: 170 LBS | TEMPERATURE: 208.4 F

## 2023-12-18 PROCEDURE — 99213 OFFICE O/P EST LOW 20 MIN: CPT

## 2023-12-18 RX ORDER — SACUBITRIL AND VALSARTAN 97; 103 MG/1; MG/1
97-103 TABLET, FILM COATED ORAL
Qty: 60 | Refills: 5 | Status: ACTIVE | COMMUNITY
Start: 2021-04-13 | End: 1900-01-01

## 2023-12-18 RX ORDER — DAPAGLIFLOZIN 10 MG/1
10 TABLET, FILM COATED ORAL
Qty: 30 | Refills: 5 | Status: ACTIVE | COMMUNITY
Start: 2022-01-26 | End: 1900-01-01

## 2023-12-18 RX ORDER — CARVEDILOL 12.5 MG/1
12.5 TABLET, FILM COATED ORAL
Qty: 60 | Refills: 5 | Status: ACTIVE | COMMUNITY
Start: 2021-04-13 | End: 1900-01-01

## 2023-12-18 RX ORDER — FOLIC ACID 1 MG/1
1 TABLET ORAL DAILY
Qty: 30 | Refills: 3 | Status: DISCONTINUED | COMMUNITY
Start: 2023-12-18 | End: 2023-12-18

## 2023-12-18 RX ORDER — PREDNISONE 5 MG/1
5 TABLET ORAL
Qty: 30 | Refills: 6 | Status: ACTIVE | COMMUNITY
Start: 2022-12-21 | End: 1900-01-01

## 2023-12-18 NOTE — PHYSICAL EXAM
[Well Developed] : well developed [Well Nourished] : well nourished [Normal Venous Pressure] : normal venous pressure [Clear Lung Fields] : clear lung fields [Good Air Entry] : good air entry [Soft] : abdomen soft [Non Tender] : non-tender [Normal Gait] : normal gait [Moves all extremities] : moves all extremities [Alert and Oriented] : alert and oriented [Normal memory] : normal memory [de-identified] : RRR, no murmurs or gallops  [de-identified] : Warm, no edema

## 2023-12-18 NOTE — ASSESSMENT
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  Today he reports well controlled NYHA  II symptoms and appears euvolemic on exam. His BNP has reassuringly nearly normalized and he is tolerating good doses of HF medical therapy.  He was found to have sarcoidosis and started on prednisone but without signs of resolution of FDG activity so will add methotrexate.

## 2023-12-18 NOTE — HISTORY OF PRESENT ILLNESS
[FreeTextEntry1] : Cardiologist: Dr. Solorzano  Mr. 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  He previously had LVEF 45-50% and followed with Dr. Solorzano. He noted to develop worsening dyspnea on exertion with TTE 4/2021 revealing severe LV dysfunction. He underwent R/LHC which revealed normal coronary arteries, and severe cpcPH with borderline cardiac output. Subsequent cMRI revealed patchy LGE. He has tolerated uptitration of GDMT and has some remodeling with improvement of LVEF from 20% and reduction in LV dimensions but remains with LV dysfunction. He does not yet have an ICD but has been referred to EP and not amenable. He has not had repeat hospitalizations and has had stable nonlimiting NYHA II symptoms with good quality of life.  I performed a PET/CT 12/2022 which was suggestive of sarcoidosis. He was started on high dose prednisone with a slow taper to 5 mg after this diagnosis but repeat PET/CT 11/2023 showed no change in perfusion defect.  He presents today for followup. He overall feels the same since last visit. He is still going 3.5-4.0 on the treadmill for 30 minutes at a time. He can climb 4-5 flights of stairs before stopping. Denies dyspnea during household activities. He notes some volume retention with increased fluid intake that resolved with PRN diuretics. Notes occasional dizziness when standing up quickly. Denies orthopnea or lower extremity edema. Denies syncope. Denies palpitations. Rates his QOL as a 9/10 at this time, same as last visit.

## 2023-12-18 NOTE — CARDIOLOGY SUMMARY
[de-identified] : \par  EKG 6/2023: SB with first degree AVB (), IVCD in LBBB with  ms \par  EKG 1/2022: SB with first degree AVB (), IVCD in the left bundle with  ms, LAFB, lateral T wave inversions\par  EKG 9/2021: SB, IVCD, LAFB, lateral T wave inversions [de-identified] : TTE 11/2023: LV 4.8 cm, LVEF 30-35%, moderate concentric LVH, LVTO VTI 16 cm, normal RV size/function, estimated RA pressures 3 mmHg, estimated PASP 18 mmHg TTE 11/2021: LV 6.8cm, LVEF 20%, LVOT VTI 17 cm, mild concentric LVH, normal RV size/function, mild-moderate AI, PASP not measured  TTE 4/2021: LVEF 15-20%    [de-identified] : \par  Cardiac MRI 4/2021: LVEF 21%, no significant LVH, RVEF 49%, patchy mesocardial/transmural/subepicardial LGE throughout suggestive of NICM including sarcoidosis or genetic cardiomyopathy\par  \par  CT Chest 4/2021: severe emphesymatous changes\par  \par   [de-identified] : PET/CT 11/2023: no change in perfusion defect in distal anteroseptal wall with FDG uptake suggestive of persistent cardiac sarcoidosis or other inflammatory myopathies. no extracadiac sarcoid.  PET/CT 12/2022: heterogeneous FDG uptake corresponding to abnormal areas of LGE on cMRI consistent with cardiac sarcoidosis  [de-identified] : \par  [facility] 4/2021: normal coronary arteries\par  [facility] 4/2021: RA 7, RV 65/7, PCWP 23, PA 87/37/52 (73/25/42 after Nitro), AoSat 84% RA, PA Sat 53% RA, CO 3.94, CI 2.08. PVR 7.4\par

## 2023-12-18 NOTE — DISCUSSION/SUMMARY
[FreeTextEntry1] : # Chronic systolic heart failure - Etiology: due to cardiac sarcoidosis, mnanagement as below  - GDMT: current regimen is Entresto  mg BID, carvedilol 12.5 mg BID, spironolactone 25 mg daily, and Farxiga 10 mg daily. unable to uptitrate BB without device given HR < 60  - Diuretic: euvolemic on PRN lasix 20 mg daily  - Device: multiple indications for DC-ICD for primary prevention (in particular, sarcoidosis increases VT risk) and also to uptitrate beta blocker. he has seen Dr. Koch but patient not ready yet and will contact when amenable. I again discussed risks of SCD today, he is not amenable to pursue ICD but will let me know if/when ready  - Last TTE 11/2023 - Labs: repeat today and again in 1 month on methotrexate  # Cardiac sarcoidosis - Started on high dose prednisone, weaned to 5 mg daily but with persistent FDG uptake - Will start methotrexate 5 mg weekly. Will call patient in 2 weeks and 4 weeks to increase to 7.5 and 10 mg daily if tolerating with repeat CBC/CMP. He takes a multivitamin daily which is to be continued for folate.  - Continue prednisone 5 mg daily - Will repeat PET/CT in 6 months - No evidence of extracardiac sarcoidosis   # Combined pre and post-capillary pulmonary hypertension - suspect component of heart failure and sarcoidosis as well as COPD (CT with severe emphysematous changes)  - PASP normalized on TTE   # HTN - controlling with GDMT as above   Return to clinic in 5 months. Will call in 2 and 4 weeks to increase methotrexate if tolerating and get labs in 1 month.

## 2023-12-21 LAB
ALBUMIN SERPL ELPH-MCNC: 4.6 G/DL
ALP BLD-CCNC: 75 U/L
ALT SERPL-CCNC: 17 U/L
ANION GAP SERPL CALC-SCNC: 11 MMOL/L
AST SERPL-CCNC: 14 U/L
BILIRUB SERPL-MCNC: 0.5 MG/DL
BUN SERPL-MCNC: 21 MG/DL
CALCIUM SERPL-MCNC: 10.2 MG/DL
CHLORIDE SERPL-SCNC: 108 MMOL/L
CO2 SERPL-SCNC: 24 MMOL/L
CREAT SERPL-MCNC: 1 MG/DL
EGFR: 81 ML/MIN/1.73M2
GLUCOSE SERPL-MCNC: 84 MG/DL
HCT VFR BLD CALC: 46.6 %
HGB BLD-MCNC: 15 G/DL
MCHC RBC-ENTMCNC: 29.4 PG
MCHC RBC-ENTMCNC: 32.2 GM/DL
MCV RBC AUTO: 91.4 FL
NT-PROBNP SERPL-MCNC: 489 PG/ML
PLATELET # BLD AUTO: 335 K/UL
POTASSIUM SERPL-SCNC: 4.5 MMOL/L
PROT SERPL-MCNC: 6.8 G/DL
RBC # BLD: 5.1 M/UL
RBC # FLD: 13.2 %
SODIUM SERPL-SCNC: 143 MMOL/L
WBC # FLD AUTO: 5.3 K/UL

## 2024-01-18 ENCOUNTER — APPOINTMENT (OUTPATIENT)
Dept: HEART AND VASCULAR | Facility: CLINIC | Age: 69
End: 2024-01-18
Payer: MEDICARE

## 2024-01-18 PROCEDURE — 36415 COLL VENOUS BLD VENIPUNCTURE: CPT

## 2024-01-19 LAB
ALBUMIN SERPL ELPH-MCNC: 4.5 G/DL
ALP BLD-CCNC: 77 U/L
ALT SERPL-CCNC: 21 U/L
ANION GAP SERPL CALC-SCNC: 13 MMOL/L
AST SERPL-CCNC: 17 U/L
BILIRUB SERPL-MCNC: 1.2 MG/DL
BUN SERPL-MCNC: 19 MG/DL
CALCIUM SERPL-MCNC: 9.7 MG/DL
CHLORIDE SERPL-SCNC: 104 MMOL/L
CO2 SERPL-SCNC: 24 MMOL/L
CREAT SERPL-MCNC: 1.15 MG/DL
EGFR: 68 ML/MIN/1.73M2
GLUCOSE SERPL-MCNC: 87 MG/DL
HCT VFR BLD CALC: 49 %
HGB BLD-MCNC: 15.6 G/DL
MCHC RBC-ENTMCNC: 28.6 PG
MCHC RBC-ENTMCNC: 31.8 GM/DL
MCV RBC AUTO: 89.9 FL
PLATELET # BLD AUTO: 381 K/UL
POTASSIUM SERPL-SCNC: 4.2 MMOL/L
PROT SERPL-MCNC: 7.1 G/DL
RBC # BLD: 5.45 M/UL
RBC # FLD: 13.6 %
SODIUM SERPL-SCNC: 141 MMOL/L
WBC # FLD AUTO: 5.39 K/UL

## 2024-01-19 RX ORDER — METHOTREXATE 2.5 MG/1
2.5 TABLET ORAL
Qty: 12 | Refills: 0 | Status: DISCONTINUED | COMMUNITY
Start: 2023-12-18 | End: 2024-01-19

## 2024-02-08 ENCOUNTER — RX RENEWAL (OUTPATIENT)
Age: 69
End: 2024-02-08

## 2024-02-08 RX ORDER — ATORVASTATIN CALCIUM 40 MG/1
40 TABLET, FILM COATED ORAL
Qty: 90 | Refills: 3 | Status: ACTIVE | COMMUNITY
Start: 2023-06-20 | End: 1900-01-01

## 2024-02-22 ENCOUNTER — APPOINTMENT (OUTPATIENT)
Dept: RHEUMATOLOGY | Facility: CLINIC | Age: 69
End: 2024-02-22
Payer: MEDICARE

## 2024-02-22 VITALS
TEMPERATURE: 207.86 F | HEIGHT: 68 IN | HEART RATE: 57 BPM | SYSTOLIC BLOOD PRESSURE: 122 MMHG | DIASTOLIC BLOOD PRESSURE: 70 MMHG | WEIGHT: 170 LBS | OXYGEN SATURATION: 99 % | BODY MASS INDEX: 25.76 KG/M2

## 2024-02-22 DIAGNOSIS — I42.9 CARDIOMYOPATHY, UNSPECIFIED: ICD-10-CM

## 2024-02-22 PROCEDURE — 99205 OFFICE O/P NEW HI 60 MIN: CPT | Mod: 25

## 2024-02-22 PROCEDURE — 36415 COLL VENOUS BLD VENIPUNCTURE: CPT

## 2024-02-22 NOTE — REASON FOR VISIT
[Consultation] : a consultation visit [FreeTextEntry1] : cardiac sarcoidosis, high risk medication use, chronic steroid use

## 2024-02-22 NOTE — ASSESSMENT
[FreeTextEntry1] : ASSESSMENT and PLAN:     67 yo M w h/o Stage C NICM ( LVEF 30%) secondary to probable cardiac sarcoidosis, HTN, severe pulmonary hypertension on chronic steroids and MTX is referred for further care,  # Sarcoidosis # High risk medication use # Chronic steroid use # Long term medication use and monitoring Dx in 4/2021 cMRI: LVEF 21%,, patchy mesocardial/transmural/subepicardial LGE throughout suggestive of NICM including sarcoidosis. Started on tapering PDN. MTX added 12/23 and uptitrated to 10 mg QW in January 2024. Today, stable, no worsening complaints, tolerating MTX and 5 mg PDN well.  Plan: -  I will check labs necessary to assure no side effects with long-term medication use and disease progression.. Check CBC w diff, CMP, CRP, Sed rate, SPEP, immunofixation, IgG levels, sIL2Rc levels. - Will check basic infectious w/up in a case he needs biologic tx.  - Increase MTX 15 mg QW+ daily FA. Asked him to stop multivitamins and will Rx 1mgFA QD.  #Long term steroid use #Bone health Will check 1,25 [place] vit D. Will not replenish at this point. Stop multivitamins Screen pt for osteoporosis.   - Follow up in 4w   Maira Trujillo MD Rheumatology Attending

## 2024-02-22 NOTE — PHYSICAL EXAM
[TextEntry] :   PHYSICAL EXAM: Vital signs reviewed, normal . Pain 0/10 GEN: AAOx3, NAD - well appearing EYES: PERRL, EOMI. EAR, NOSE AND THROAT:  Oropharynx pink w/o ulcers or exudates. Optimal dentition. HEMATOLOGIC/LYMPHATIC: No LAD. PULMONARY: Good air movement, CTA, no wheezes or rales. CARDIO-VASCULAR: NS1S2, RRR, no mrg. GASTRO-INTESTINAL: Normoactive BS, soft NT/ND, no organomegaly. MUSCULOSKELETAL: DIPs- Full ROM, no synovitis. PIPs- Full ROM, no synovitis. MCPs- Full ROM, no synovitis. WRISTS- Full ROM, no synovitis. ELBOWS- Full ROM, no synovitis. SHOULDERS- Full ROM b/l. No localized tenderness in subacromial bursa or biceps tendon. HIPS- Full ROM including internal and external rotation; No localized tenderness over greater trochanters. KNEES- No effusion, normal ROM, no local tenderness in the region of anserine bursa or joint line. ANKLES- Full ROM, no synovitis. FEET- NO MTP squeeze tenderness. Warm extremities, 2+ radial and pedal pulses, no peripheral edema. NEUROLOGIC: Fluent speech, normal gait. Muscle strength 5/5 in all extremities. SKIN: Clear, no rashes.

## 2024-02-22 NOTE — REVIEW OF SYSTEMS
[TextEntry] :  REVIEW OF THE SYSTEMS CONSTITUTIONAL: No fever, chills, night sweats, weight loss, fatigue. SKIN: No rash, psoriatic plaques, photosensitivity, Raynaud's phenomenon, subcutaneous nodules, tophi, alopecia, skin thickening. EYES: No dry eyes, history of inflammatory eye disease. No blurry vision or diplopia. ENMT: No dry mouth, oral ulcers, sinus problems, epistaxis. No jaw or scalp tenderness. MUSCULOSKELETAL: No joint pain or swelling. NEUROLOGIC: No HA, paresthesia, generalized or focal weakness. CARDIO-VASCULAR: No CP, orthopnea, palpitations. PULMONARY: No SOB, cough, hemoptysis, wheezing. GASTROINTESTINAL: No recent/current diarrhea, nausea/vomiting, abdominal pain, bleed, dysphagia or GERD. GENITOURINARY: No hematuria or dysuria. HEMATOLOGIC/LYMPHATIC: No lymphadenopathy.

## 2024-02-22 NOTE — HISTORY OF PRESENT ILLNESS
[FreeTextEntry1] :    69 yo M w h/o Stage C NICM ( LVEF 30%) secondary to probable cardiac sarcoidosis, HTN, severe pulmonary hypertension on chronic steroids and MTX is referred for further care,  4/2021 C/o SOB ; had TTE showing severe LV dysfunction.   RHC 4/2021: RA 7, RV 65/7, PCWP 23, PA 87/37/52 (73/25/42 after Nitro), AoSat 84% RA, PA Sat 53% RA, CO 3.94, CI 2.08. PVR 7.4 severe cpcPH with borderline cardiac output. TTE 4/2021: LVEF 15-20%   CT/MRI: Cardiac MRI 4/2021: LVEF 21%, no significant LVH, RVEF 49%, patchy mesocardial/transmural/subepicardial LGE throughout suggestive of NICM including sarcoidosis or genetic cardiomyopathy Started on tapering PDN PET/CT 12/2022 which was suggestive of sarcoidosis. He was started on high dose prednisone with a slow taper to 5 mg after this diagnosis but repeat PET/CT 11/2023 showed no change in perfusion defect. 12/18/2023 Started MTX 5 mg QW+ daily FA. 1/24 MTX uptitrated 10 mg QW+ daily FA 2/22/2024 Pt is here and denies any new or worsening complaints. Denies joint pain or swelling. No overt morning stiffness. Denies SOB, CP.  Takes all meds as scheduled and has no issues with meds. He is on 5 mg PDN QD and 10 mg MTX QW. FA has been dc'ed bc he takes multivitamins. Extensive ROS obtained and documented below.

## 2024-02-28 LAB
24R-OH-CALCIDIOL SERPL-MCNC: 53 PG/ML
ALBUMIN MFR SERPL ELPH: 61.9 %
ALBUMIN SERPL ELPH-MCNC: 4.6 G/DL
ALBUMIN SERPL-MCNC: 4.5 G/DL
ALBUMIN/GLOB SERPL: 1.6 RATIO
ALP BLD-CCNC: 92 U/L
ALPHA1 GLOB MFR SERPL ELPH: 3.8 %
ALPHA1 GLOB SERPL ELPH-MCNC: 0.3 G/DL
ALPHA2 GLOB MFR SERPL ELPH: 8.9 %
ALPHA2 GLOB SERPL ELPH-MCNC: 0.6 G/DL
ALT SERPL-CCNC: 101 U/L
ANION GAP SERPL CALC-SCNC: 14 MMOL/L
AST SERPL-CCNC: 68 U/L
B-GLOBULIN MFR SERPL ELPH: 11.1 %
B-GLOBULIN SERPL ELPH-MCNC: 0.8 G/DL
BASOPHILS # BLD AUTO: 0.05 K/UL
BASOPHILS NFR BLD AUTO: 0.7 %
BILIRUB SERPL-MCNC: 0.9 MG/DL
BUN SERPL-MCNC: 28 MG/DL
CALCIUM SERPL-MCNC: 9.8 MG/DL
CHLORIDE SERPL-SCNC: 105 MMOL/L
CO2 SERPL-SCNC: 25 MMOL/L
CREAT SERPL-MCNC: 1.19 MG/DL
CRP SERPL-MCNC: <3 MG/L
EGFR: 66 ML/MIN/1.73M2
EOSINOPHIL # BLD AUTO: 0.02 K/UL
EOSINOPHIL NFR BLD AUTO: 0.3 %
ERYTHROCYTE [SEDIMENTATION RATE] IN BLOOD BY WESTERGREN METHOD: 4 MM/HR
GAMMA GLOB FLD ELPH-MCNC: 1 G/DL
GAMMA GLOB MFR SERPL ELPH: 14.3 %
GLUCOSE SERPL-MCNC: 86 MG/DL
HBV CORE IGG+IGM SER QL: NONREACTIVE
HBV SURFACE AB SER QL: NONREACTIVE
HBV SURFACE AG SER QL: NONREACTIVE
HCT VFR BLD CALC: 46.7 %
HCV AB SER QL: NONREACTIVE
HCV S/CO RATIO: 0.08 S/CO
HGB BLD-MCNC: 15.1 G/DL
IGG SER QL IEP: 1241 MG/DL
IGG1 SER-MCNC: 600 MG/DL
IGG2 SER-MCNC: 401 MG/DL
IGG3 SER-MCNC: 145.8 MG/DL
IGG4 SER-MCNC: 23.9 MG/DL
IL2 SERPL-MCNC: 349.7 PG/ML
IMM GRANULOCYTES NFR BLD AUTO: 0.3 %
INTERPRETATION SERPL IEP-IMP: NORMAL
LYMPHOCYTES # BLD AUTO: 1.59 K/UL
LYMPHOCYTES NFR BLD AUTO: 21.7 %
M PROTEIN SPEC IFE-MCNC: NORMAL
M TB IFN-G BLD-IMP: NEGATIVE
MAN DIFF?: NORMAL
MCHC RBC-ENTMCNC: 30 PG
MCHC RBC-ENTMCNC: 32.3 GM/DL
MCV RBC AUTO: 92.8 FL
MONOCYTES # BLD AUTO: 0.58 K/UL
MONOCYTES NFR BLD AUTO: 7.9 %
NEUTROPHILS # BLD AUTO: 5.07 K/UL
NEUTROPHILS NFR BLD AUTO: 69.1 %
PLATELET # BLD AUTO: 353 K/UL
POTASSIUM SERPL-SCNC: 4.8 MMOL/L
PROT SERPL-MCNC: 7.3 G/DL
QUANTIFERON TB PLUS MITOGEN MINUS NIL: >10 IU/ML
QUANTIFERON TB PLUS NIL: 0.17 IU/ML
QUANTIFERON TB PLUS TB1 MINUS NIL: -0.07 IU/ML
QUANTIFERON TB PLUS TB2 MINUS NIL: -0.13 IU/ML
RBC # BLD: 5.03 M/UL
RBC # FLD: 14.7 %
SODIUM SERPL-SCNC: 144 MMOL/L
WBC # FLD AUTO: 7.33 K/UL

## 2024-03-08 ENCOUNTER — APPOINTMENT (OUTPATIENT)
Dept: RHEUMATOLOGY | Facility: CLINIC | Age: 69
End: 2024-03-08
Payer: MEDICARE

## 2024-03-08 VITALS
TEMPERATURE: 207.86 F | HEIGHT: 68 IN | HEART RATE: 54 BPM | WEIGHT: 170 LBS | DIASTOLIC BLOOD PRESSURE: 65 MMHG | SYSTOLIC BLOOD PRESSURE: 101 MMHG | BODY MASS INDEX: 25.76 KG/M2 | OXYGEN SATURATION: 99 %

## 2024-03-08 PROCEDURE — 99215 OFFICE O/P EST HI 40 MIN: CPT | Mod: 25

## 2024-03-08 PROCEDURE — 36415 COLL VENOUS BLD VENIPUNCTURE: CPT

## 2024-03-08 NOTE — REASON FOR VISIT
[Follow-Up: _____] : a [unfilled] follow-up visit [FreeTextEntry1] : cardiac sarcoidosis, high risk medication use, abnormal LFTs

## 2024-03-08 NOTE — HISTORY OF PRESENT ILLNESS
[FreeTextEntry1] : 69 yo M w h/o Stage C NICM ( LVEF 30%) secondary to probable cardiac sarcoidosis, HTN, severe pulmonary hypertension on chronic steroids and MTX is referred for further care, 4/2021 C/o SOB ; had TTE showing severe LV dysfunction. RHC 4/2021: RA 7, RV 65/7, PCWP 23, PA 87/37/52 (73/25/42 after Nitro), AoSat 84% RA, PA Sat 53% RA, CO 3.94, CI 2.08. PVR 7.4 severe cpcPH with borderline cardiac output. TTE 4/2021: LVEF 15-20% CT/MRI: Cardiac MRI 4/2021: LVEF 21%, no significant LVH, RVEF 49%, patchy mesocardial/transmural/subepicardial LGE throughout suggestive of NICM including sarcoidosis or genetic cardiomyopathy Started on tapering PDN PET/CT 12/2022 which was suggestive of sarcoidosis. He was started on high dose prednisone with a slow taper to 5 mg after this diagnosis but repeat PET/CT 11/2023 showed no change in perfusion defect. 12/18/2023 Started MTX 5 mg QW+ daily FA. 1/24 MTX uptitrated 10 mg QW+ daily FA 2/22/2024 Pt is here and denies any new or worsening complaints. Denies joint pain or swelling. No overt morning stiffness. Denies SOB, CP. Takes all meds as scheduled and has no issues with meds. He is on 5 mg PDN QD and 10 mg MTX QW. FA has been dc'ed bc he takes multivitamins. Extensive ROS obtained and documented below. LFTs rechecked and were elevated - instructed to hold MTX x 2 weeks but c/w FA 3/8/2024 Pt is here and denies any new or worsening complaints. Denies joint pain or swelling. No overt morning stiffness. Denies CP or SOB, no LE swelling. Except MTX he is taking  all meds as scheduled and has no issues with meds. Extensive ROS obtained and documented below.

## 2024-03-08 NOTE — ASSESSMENT
[FreeTextEntry1] : ASSESSMENT and PLAN:   69 yo M w h/o Stage C NICM ( LVEF 30%) secondary to probable cardiac sarcoidosis, HTN, severe pulmonary hypertension on chronic steroids and MTX is referred for further care,  # Sarcoidosis # High risk medication use # Chronic steroid use # Long term medication use and monitoring # Transaminitis Dx in 4/2021 cMRI: LVEF 21%, patchy mesocardial/transmural/subepicardial LGE throughout suggestive of NICM including sarcoidosis. Started on tapering PDN. MTX added 12/23 and uptitrated to 10 mg QW in January 2024. February Plan was to increase to 15mgQW + daily FA but he had transaminitis. Instructed to hold MTX x 2 weeks.  Today, stable, no worsening complaints, on 5 mg PDN well. Plan: - I will check labs necessary to assure no side effects with long-term medication use : Check CBC, CMP. - If all normal will resume MTX. C/w FA. If unable to tolerate will try MMF - Liver US  - C/w PDN 5 mg QD.   #Long term steroid use #Bone health 1,25 [place] vit D normal. Screen pt for osteoporosis.  - Follow up in 4w  Maira Trujillo MD Rheumatology Attending.

## 2024-03-11 LAB
ALBUMIN SERPL ELPH-MCNC: 4.3 G/DL
ALP BLD-CCNC: 76 U/L
ALT SERPL-CCNC: 18 U/L
ANION GAP SERPL CALC-SCNC: 14 MMOL/L
AST SERPL-CCNC: 17 U/L
BASOPHILS # BLD AUTO: 0.06 K/UL
BASOPHILS NFR BLD AUTO: 1.1 %
BILIRUB SERPL-MCNC: 0.9 MG/DL
BUN SERPL-MCNC: 19 MG/DL
CALCIUM SERPL-MCNC: 10.1 MG/DL
CHLORIDE SERPL-SCNC: 104 MMOL/L
CO2 SERPL-SCNC: 23 MMOL/L
CREAT SERPL-MCNC: 1.18 MG/DL
EGFR: 66 ML/MIN/1.73M2
EOSINOPHIL # BLD AUTO: 0.02 K/UL
EOSINOPHIL NFR BLD AUTO: 0.4 %
GLUCOSE SERPL-MCNC: 88 MG/DL
HCT VFR BLD CALC: 46 %
HGB BLD-MCNC: 15 G/DL
IMM GRANULOCYTES NFR BLD AUTO: 0.2 %
LYMPHOCYTES # BLD AUTO: 1.23 K/UL
LYMPHOCYTES NFR BLD AUTO: 22.9 %
MAN DIFF?: NORMAL
MCHC RBC-ENTMCNC: 30 PG
MCHC RBC-ENTMCNC: 32.6 GM/DL
MCV RBC AUTO: 92 FL
MONOCYTES # BLD AUTO: 0.54 K/UL
MONOCYTES NFR BLD AUTO: 10.1 %
NEUTROPHILS # BLD AUTO: 3.51 K/UL
NEUTROPHILS NFR BLD AUTO: 65.3 %
PLATELET # BLD AUTO: 365 K/UL
POTASSIUM SERPL-SCNC: 4.2 MMOL/L
PROT SERPL-MCNC: 7.4 G/DL
RBC # BLD: 5 M/UL
RBC # FLD: 14.3 %
SODIUM SERPL-SCNC: 141 MMOL/L
WBC # FLD AUTO: 5.37 K/UL

## 2024-04-22 ENCOUNTER — APPOINTMENT (OUTPATIENT)
Dept: RHEUMATOLOGY | Facility: CLINIC | Age: 69
End: 2024-04-22

## 2024-04-25 ENCOUNTER — APPOINTMENT (OUTPATIENT)
Dept: RHEUMATOLOGY | Facility: CLINIC | Age: 69
End: 2024-04-25
Payer: MEDICARE

## 2024-04-25 VITALS
DIASTOLIC BLOOD PRESSURE: 62 MMHG | HEIGHT: 68 IN | TEMPERATURE: 208.04 F | OXYGEN SATURATION: 99 % | BODY MASS INDEX: 25.76 KG/M2 | WEIGHT: 170 LBS | HEART RATE: 57 BPM | SYSTOLIC BLOOD PRESSURE: 110 MMHG

## 2024-04-25 DIAGNOSIS — R74.01 ELEVATION OF LEVELS OF LIVER TRANSAMINASE LEVELS: ICD-10-CM

## 2024-04-25 DIAGNOSIS — Z79.899 OTHER LONG TERM (CURRENT) DRUG THERAPY: ICD-10-CM

## 2024-04-25 DIAGNOSIS — E67.3 HYPERVITAMINOSIS D: ICD-10-CM

## 2024-04-25 DIAGNOSIS — D86.85 SARCOID MYOCARDITIS: ICD-10-CM

## 2024-04-25 PROCEDURE — 99214 OFFICE O/P EST MOD 30 MIN: CPT

## 2024-04-25 PROCEDURE — 36415 COLL VENOUS BLD VENIPUNCTURE: CPT

## 2024-04-25 RX ORDER — METHOTREXATE 2.5 MG/1
2.5 TABLET ORAL
Qty: 18 | Refills: 3 | Status: ACTIVE | COMMUNITY
Start: 2024-01-19 | End: 1900-01-01

## 2024-04-25 RX ORDER — FOLIC ACID 1 MG/1
1 TABLET ORAL DAILY
Qty: 30 | Refills: 3 | Status: ACTIVE | COMMUNITY
Start: 2024-02-22 | End: 1900-01-01

## 2024-04-25 NOTE — HISTORY OF PRESENT ILLNESS
[FreeTextEntry1] : 67 yo M w h/o Stage C NICM ( LVEF 30%) secondary to probable cardiac sarcoidosis, HTN, severe pulmonary hypertension on chronic steroids and MTX is referred for further care, 4/2021 C/o SOB ; had TTE showing severe LV dysfunction. RHC 4/2021: RA 7, RV 65/7, PCWP 23, PA 87/37/52 (73/25/42 after Nitro), AoSat 84% RA, PA Sat 53% RA, CO 3.94, CI 2.08. PVR 7.4 severe cpcPH with borderline cardiac output. TTE 4/2021: LVEF 15-20% CT/MRI: Cardiac MRI 4/2021: LVEF 21%, no significant LVH, RVEF 49%, patchy mesocardial/transmural/subepicardial LGE throughout suggestive of NICM including sarcoidosis or genetic cardiomyopathy Started on tapering PDN PET/CT 12/2022 which was suggestive of sarcoidosis. He was started on high dose prednisone with a slow taper to 5 mg after this diagnosis but repeat PET/CT 11/2023 showed no change in perfusion defect. 12/18/2023 Started MTX 5 mg QW+ daily FA. 1/24 MTX uptitrated 10 mg QW+ daily FA 2/22/2024 Pt is here and denies any new or worsening complaints. Denies joint pain or swelling. No overt morning stiffness. Denies SOB, CP. Takes all meds as scheduled and has no issues with meds. He is on 5 mg PDN QD and 10 mg MTX QW. FA has been dc'ed bc he takes multivitamins. Extensive ROS obtained and documented below. LFTs rechecked and were elevated - instructed to hold MTX x 2 weeks but c/w FA 3/8/2024 Pt is here and denies any new or worsening complaints. Denies joint pain or swelling. No overt morning stiffness. Denies CP or SOB, no LE swelling. Except MTX he is taking all meds as scheduled and has no issues with meds. Extensive ROS obtained and documented below. CMP WNL esume mtx 10 mg QW and to c/w FA daily. 4/25/2024 Pt is here and denies any new or worsening complaints. Denies joint pain or swelling. No overt morning stiffness. Takes all meds as scheduled and has no issues with meds. Extensive ROS obtained and documented below.

## 2024-04-25 NOTE — ASSESSMENT
[FreeTextEntry1] : ASSESSMENT and PLAN:   67 yo M w h/o Stage C NICM ( LVEF 30%) secondary to probable cardiac sarcoidosis, HTN, severe pulmonary hypertension on chronic steroids and MTX is referred for further care,  # Sarcoidosis # High risk medication use # Chronic steroid use # Long term medication use and monitoring # Transaminitis Dx in 4/2021 cMRI: LVEF 21%, patchy mesocardial/transmural/subepicardial LGE throughout suggestive of NICM including sarcoidosis. Started on tapering PDN. MTX added 12/23 and uptitrated to 10 mg QW in January 2024. February Plan was to increase to 15mgQW + daily FA but he had transaminitis. Instructed to hold MTX x 2 weeks and restarted at10 mg QW and to c/w FA. Plan: Check CBC, CMP, CRP - if abnormal will dc MtX and try MMF  #Long term steroid use #Bone health 1,25 [place] vit D normal. Screen pt for osteoporosis.  - Follow up in 4w  Maira Trujillo MD Rheumatology Attending.

## 2024-04-29 LAB
ALBUMIN SERPL ELPH-MCNC: 4.6 G/DL
ALP BLD-CCNC: 67 U/L
ALT SERPL-CCNC: 24 U/L
ANION GAP SERPL CALC-SCNC: 14 MMOL/L
AST SERPL-CCNC: 21 U/L
BASOPHILS # BLD AUTO: 0.07 K/UL
BASOPHILS NFR BLD AUTO: 1.5 %
BILIRUB SERPL-MCNC: 1 MG/DL
BUN SERPL-MCNC: 17 MG/DL
CALCIUM SERPL-MCNC: 9.8 MG/DL
CHLORIDE SERPL-SCNC: 103 MMOL/L
CO2 SERPL-SCNC: 24 MMOL/L
CREAT SERPL-MCNC: 1.2 MG/DL
CRP SERPL-MCNC: <3 MG/L
EGFR: 65 ML/MIN/1.73M2
EOSINOPHIL # BLD AUTO: 0.04 K/UL
EOSINOPHIL NFR BLD AUTO: 0.8 %
GLUCOSE SERPL-MCNC: 66 MG/DL
HCT VFR BLD CALC: 48.8 %
HGB BLD-MCNC: 15.6 G/DL
IMM GRANULOCYTES NFR BLD AUTO: 0.2 %
LYMPHOCYTES # BLD AUTO: 2.23 K/UL
LYMPHOCYTES NFR BLD AUTO: 46.8 %
MAN DIFF?: NORMAL
MCHC RBC-ENTMCNC: 29.9 PG
MCHC RBC-ENTMCNC: 32 GM/DL
MCV RBC AUTO: 93.5 FL
MONOCYTES # BLD AUTO: 0.53 K/UL
MONOCYTES NFR BLD AUTO: 11.1 %
NEUTROPHILS # BLD AUTO: 1.89 K/UL
NEUTROPHILS NFR BLD AUTO: 39.6 %
PLATELET # BLD AUTO: 375 K/UL
POTASSIUM SERPL-SCNC: 4.5 MMOL/L
PROT SERPL-MCNC: 7.2 G/DL
RBC # BLD: 5.22 M/UL
RBC # FLD: 14 %
SODIUM SERPL-SCNC: 141 MMOL/L
WBC # FLD AUTO: 4.77 K/UL

## 2024-05-20 ENCOUNTER — APPOINTMENT (OUTPATIENT)
Dept: HEART FAILURE | Facility: CLINIC | Age: 69
End: 2024-05-20
Payer: MEDICARE

## 2024-05-20 VITALS
TEMPERATURE: 97.8 F | DIASTOLIC BLOOD PRESSURE: 48 MMHG | BODY MASS INDEX: 26.37 KG/M2 | HEART RATE: 57 BPM | HEIGHT: 68 IN | OXYGEN SATURATION: 96 % | WEIGHT: 174 LBS | SYSTOLIC BLOOD PRESSURE: 90 MMHG

## 2024-05-20 PROCEDURE — G2211 COMPLEX E/M VISIT ADD ON: CPT

## 2024-05-20 PROCEDURE — 99213 OFFICE O/P EST LOW 20 MIN: CPT

## 2024-05-20 NOTE — ASSESSMENT
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  Today he reports well controlled NYHA II symptoms and appears euvolemic on exam. His BNP has reassuringly nearly normalized and he is tolerating good doses of HF medical therapy.

## 2024-05-20 NOTE — HISTORY OF PRESENT ILLNESS
[FreeTextEntry1] : Cardiologist: Dr. Solorzano  Mr. 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  He previously had LVEF 45-50% and followed with Dr. Solorzano. He noted to develop worsening dyspnea on exertion with TTE 4/2021 revealing severe LV dysfunction. He underwent R/LHC which revealed normal coronary arteries, and severe cpcPH with borderline cardiac output. Subsequent cMRI revealed patchy LGE. He has tolerated uptitration of GDMT and has some remodeling with improvement of LVEF from 20% and reduction in LV dimensions but remains with LV dysfunction. He does not yet have an ICD but has been referred to EP and not amenable. He has not had repeat hospitalizations and has had stable nonlimiting NYHA II symptoms with good quality of life.  I performed a PET/CT 12/2022 which was suggestive of sarcoidosis. He was started on high dose prednisone with a slow taper to 5 mg after this diagnosis but repeat PET/CT 11/2023 showed no change in perfusion defect so we added methotrexate. He has now been following with Dr. Trujillo from rheumatology.  He presents today for followup. He overall feels the same since last visit. He is still going 3.5-4.0 on the treadmill for 30 minutes at a time. He can climb 4-5 flights of stairs before stopping. Denies dyspnea during household activities. He notes some volume retention with increased fluid intake that resolved with PRN diuretics. Notes occasional dizziness when standing up quickly. Denies orthopnea or lower extremity edema. Denies syncope. Denies palpitations. Rates his QOL as a 9/10 at this time, same as last visit.

## 2024-05-20 NOTE — PHYSICAL EXAM
[Well Developed] : well developed [Well Nourished] : well nourished [Normal Venous Pressure] : normal venous pressure [Clear Lung Fields] : clear lung fields [Good Air Entry] : good air entry [Soft] : abdomen soft [Non Tender] : non-tender [Normal Gait] : normal gait [Moves all extremities] : moves all extremities [Alert and Oriented] : alert and oriented [Normal memory] : normal memory [de-identified] : RRR, no murmurs or gallops  [de-identified] : Warm, no edema

## 2024-05-20 NOTE — DISCUSSION/SUMMARY
[FreeTextEntry1] : # Chronic systolic heart failure - Etiology: due to cardiac sarcoidosis, management as below  - GDMT: current regimen is Entresto  mg BID, carvedilol 12.5 mg BID, spironolactone 25 mg daily, and Farxiga 10 mg daily. unable to uptitrate BB without device given HR < 60  - Diuretic: euvolemic on PRN lasix 20 mg daily  - Device: multiple indications for DC-ICD for primary prevention (in particular, sarcoidosis increases VT risk) and also to uptitrate beta blocker. he has seen Dr. Koch but patient not ready yet and will contact us when amenable. I have discussed risks of SCD multiple times  - Last TTE 11/2023, will repeat next visit  - Labs: 4/15  # Cardiac sarcoidosis - Started on high dose prednisone, weaned to 5 mg daily but with persistent FDG uptake.  - Currently on methotrexate 10 mg daily as well as prednisone 5 mg daily, following with Dr. Trujillo. developed transaminitis on higher dose - Will eventually repeat PET/CT after maximized on MTX and possibly weaned from prednisone, will defer to rheumatology   - No evidence of extracardiac sarcoidosis   # Combined pre and post-capillary pulmonary hypertension - suspect component of heart failure and sarcoidosis as well as COPD (CT with severe emphysematous changes)  - PASP normalized on TTE   # HTN - controlling with GDMT as above   Return to clinic in 6 months with TTE

## 2024-05-20 NOTE — CARDIOLOGY SUMMARY
[de-identified] : \par  EKG 6/2023: SB with first degree AVB (), IVCD in LBBB with  ms \par  EKG 1/2022: SB with first degree AVB (), IVCD in the left bundle with  ms, LAFB, lateral T wave inversions\par  EKG 9/2021: SB, IVCD, LAFB, lateral T wave inversions [de-identified] : TTE 11/2023: LV 4.8 cm, LVEF 30-35%, moderate concentric LVH, LVTO VTI 16 cm, normal RV size/function, estimated RA pressures 3 mmHg, estimated PASP 18 mmHg TTE 11/2021: LV 6.8cm, LVEF 20%, LVOT VTI 17 cm, mild concentric LVH, normal RV size/function, mild-moderate AI, PASP not measured  TTE 4/2021: LVEF 15-20%    [de-identified] : \par  Cardiac MRI 4/2021: LVEF 21%, no significant LVH, RVEF 49%, patchy mesocardial/transmural/subepicardial LGE throughout suggestive of NICM including sarcoidosis or genetic cardiomyopathy\par  \par  CT Chest 4/2021: severe emphesymatous changes\par  \par   [de-identified] : PET/CT 11/2023: no change in perfusion defect in distal anteroseptal wall with FDG uptake suggestive of persistent cardiac sarcoidosis or other inflammatory myopathies. no extracadiac sarcoid.  PET/CT 12/2022: heterogeneous FDG uptake corresponding to abnormal areas of LGE on cMRI consistent with cardiac sarcoidosis  [de-identified] : \par  [facility] 4/2021: normal coronary arteries\par  [facility] 4/2021: RA 7, RV 65/7, PCWP 23, PA 87/37/52 (73/25/42 after Nitro), AoSat 84% RA, PA Sat 53% RA, CO 3.94, CI 2.08. PVR 7.4\par

## 2024-05-30 ENCOUNTER — RX RENEWAL (OUTPATIENT)
Age: 69
End: 2024-05-30

## 2024-05-30 RX ORDER — FUROSEMIDE 20 MG/1
20 TABLET ORAL
Qty: 30 | Refills: 3 | Status: ACTIVE | COMMUNITY
Start: 2023-06-19 | End: 1900-01-01

## 2024-06-13 ENCOUNTER — RX RENEWAL (OUTPATIENT)
Age: 69
End: 2024-06-13

## 2024-06-13 RX ORDER — SPIRONOLACTONE 25 MG/1
25 TABLET ORAL
Qty: 30 | Refills: 3 | Status: ACTIVE | COMMUNITY
Start: 2022-01-26 | End: 1900-01-01

## 2024-07-25 ENCOUNTER — APPOINTMENT (OUTPATIENT)
Dept: RHEUMATOLOGY | Facility: CLINIC | Age: 69
End: 2024-07-25
Payer: MEDICARE

## 2024-07-25 VITALS
DIASTOLIC BLOOD PRESSURE: 63 MMHG | HEART RATE: 52 BPM | OXYGEN SATURATION: 99 % | WEIGHT: 167 LBS | BODY MASS INDEX: 25.31 KG/M2 | HEIGHT: 68 IN | TEMPERATURE: 97.2 F | SYSTOLIC BLOOD PRESSURE: 108 MMHG

## 2024-07-25 DIAGNOSIS — I27.20 PULMONARY HYPERTENSION, UNSPECIFIED: ICD-10-CM

## 2024-07-25 DIAGNOSIS — I42.9 CARDIOMYOPATHY, UNSPECIFIED: ICD-10-CM

## 2024-07-25 DIAGNOSIS — D86.85 SARCOID MYOCARDITIS: ICD-10-CM

## 2024-07-25 PROCEDURE — 36415 COLL VENOUS BLD VENIPUNCTURE: CPT

## 2024-07-25 PROCEDURE — G2211 COMPLEX E/M VISIT ADD ON: CPT

## 2024-07-25 PROCEDURE — 99215 OFFICE O/P EST HI 40 MIN: CPT

## 2024-07-25 NOTE — DATA REVIEWED
[FreeTextEntry1] : 11/23 Mild uptake throughout the wall of the aorta may also represent atherosclerotic disease.  --- End of Report ---  *** END OF ADDENDUM # 2 ***   *** ADDENDUM # 1 ***  The Impression should state the following: Differential diagnosis includes hibernating myocardium. Myocardial FDG uptake is subtle, suggestive of very mild inflammation.  Mild uptake throughout the wall of the aorta is suggestive of mild aortitis. Otherwise, no evidence of extracardiac sarcoidosis or other FDG-avid systemic inflammatory process.  The first line of the body of the report should state the following: Unchanged, faint uptake FDG uptake within the myocardium at the distal anteroseptal wall (axial images 20-24 on UniSyn or 3:21 on Carestream) corresponding to an unchanged perfusion defect at a greater area of the distal anteroseptal wall in the sestamibi scan.

## 2024-07-25 NOTE — HISTORY OF PRESENT ILLNESS
[___ Month(s) Ago] : [unfilled] month(s) ago [FreeTextEntry1] : no sob no chest , no rash, no uveitis, able to exercise regularly - slowed after Cardiac dx, no abd pain no dysphagia, no abd pain no blood in urine or stool  no recent infections,  planned for root canal was told has teeth infection and planned for root canal procedure

## 2024-07-25 NOTE — PHYSICAL EXAM
[General Appearance - Alert] : alert [PERRL With Normal Accommodation] : pupils were equal in size, round, and reactive to light [Examination Of The Oral Cavity] : the lips and gums were normal [Oropharynx] : the oropharynx was normal [] : the neck was supple [Respiration, Rhythm And Depth] : normal respiratory rhythm and effort [Auscultation Breath Sounds / Voice Sounds] : lungs were clear to auscultation bilaterally [Chest Palpation] : palpation of the chest revealed no abnormalities [Heart Rate And Rhythm] : heart rate was normal and rhythm regular [Heart Sounds] : normal S1 and S2 [Murmurs] : no murmurs [Bowel Sounds] : normal bowel sounds [Abdomen Soft] : soft [Abdomen Tenderness] : non-tender

## 2024-07-25 NOTE — ASSESSMENT
[FreeTextEntry1] : 68 y old MF with  PMH of Stage C NICM ( LVEF 30%), secondary to probable cardiac sarcoidosis( PET 2022) , severe pulmonary HTN, HTN, tx steroids and Methotrexate , transient history of transaminitis also in the past , currently not worse sob no chest pain able to exercise no rash no joint pain or swelling no new symptoms tolerating medications well planned for possible root canal procedure   Sarcoidosis Dx 4/21- MRI: LVEF 21%, patchy mesocardial/transmural/subepicardial LGE throughout suggestive of NICM including sarcoidosis. Started on tapering PDN. MTX added 12/23 and uptitrated to 10 mg QW in January 2024. - 11/23 PED/CT  FDG  Mild uptake throughout the wall of the aorta may also represent atherosclerotic disease.  --- End of Report ---  *** END OF ADDENDUM # 2 ***   *** ADDENDUM # 1 ***  The Impression should state the following: Differential diagnosis includes hibernating myocardium. Myocardial FDG uptake is subtle, suggestive of very mild inflammation.  Mild uptake throughout the wall of the aorta is suggestive of mild aortitis. Otherwise, no evidence of extracardiac sarcoidosis or other FDG-avid systemic inflammatory process.  The first line of the body of the report should state the following: Unchanged, faint uptake FDG uptake within the myocardium at the distal anteroseptal wall (axial images 20-24 on UniSyn or 3:21 on Carestream) corresponding to an unchanged perfusion defect at a greater area of the distal anteroseptal wall in the sestamibi scan. February Plan was to increase to 15mgQW + daily FA but he had transaminitis. Instructed to hold MTX x 2 weeks and restarted at10 mg QW and to c/w FA and liver function remains stable since then  -as planned for root canal procedure will plan to increase Methotrexate after procedure, discuss with Dental if teeth infection will need abs treatment, prednisone 5 mg and Methotrexate 10 mg weekly to continue - mild increased risk with infection   Check CBC, CMP, CRP , ACE  -long term steroid use-Bone health 1,25 [place] vit D normal. Screen pt for osteoporosis.  - Follow up in 4w

## 2024-07-26 LAB
ALBUMIN SERPL ELPH-MCNC: 4.4 G/DL
ALP BLD-CCNC: 68 U/L
ALT SERPL-CCNC: 14 U/L
ANION GAP SERPL CALC-SCNC: 17 MMOL/L
AST SERPL-CCNC: 20 U/L
BILIRUB SERPL-MCNC: 0.9 MG/DL
BUN SERPL-MCNC: 17 MG/DL
CALCIUM SERPL-MCNC: 9.8 MG/DL
CHLORIDE SERPL-SCNC: 103 MMOL/L
CO2 SERPL-SCNC: 20 MMOL/L
CREAT SERPL-MCNC: 1.12 MG/DL
CRP SERPL-MCNC: <3 MG/L
EGFR: 70 ML/MIN/1.73M2
ERYTHROCYTE [SEDIMENTATION RATE] IN BLOOD BY WESTERGREN METHOD: 3 MM/HR
GLUCOSE SERPL-MCNC: 73 MG/DL
HCT VFR BLD CALC: 47.7 %
HGB BLD-MCNC: 14.6 G/DL
MCHC RBC-ENTMCNC: 30.2 PG
MCHC RBC-ENTMCNC: 30.6 GM/DL
MCV RBC AUTO: 98.8 FL
PLATELET # BLD AUTO: 368 K/UL
POTASSIUM SERPL-SCNC: 4.5 MMOL/L
PROT SERPL-MCNC: 6.8 G/DL
RBC # BLD: 4.83 M/UL
RBC # FLD: 14.5 %
SODIUM SERPL-SCNC: 140 MMOL/L
WBC # FLD AUTO: 4.65 K/UL

## 2024-07-29 LAB — ACE BLD-CCNC: 23 U/L

## 2024-09-05 ENCOUNTER — APPOINTMENT (OUTPATIENT)
Dept: RHEUMATOLOGY | Facility: CLINIC | Age: 69
End: 2024-09-05
Payer: MEDICARE

## 2024-09-05 VITALS
OXYGEN SATURATION: 98 % | DIASTOLIC BLOOD PRESSURE: 62 MMHG | BODY MASS INDEX: 24.55 KG/M2 | TEMPERATURE: 97.1 F | SYSTOLIC BLOOD PRESSURE: 108 MMHG | HEART RATE: 56 BPM | HEIGHT: 68 IN | WEIGHT: 162 LBS

## 2024-09-05 PROCEDURE — G2211 COMPLEX E/M VISIT ADD ON: CPT

## 2024-09-05 PROCEDURE — 99215 OFFICE O/P EST HI 40 MIN: CPT

## 2024-09-05 RX ORDER — PREDNISONE 1 MG/1
1 TABLET ORAL
Qty: 120 | Refills: 1 | Status: ACTIVE | COMMUNITY
Start: 2024-09-05 | End: 1900-01-01

## 2024-09-05 NOTE — ASSESSMENT
[FreeTextEntry1] : 69 y old MF with  PMH of Stage C NICM ( LVEF 30%), secondary to probable cardiac sarcoidosis( PET 2022) , severe pulmonary HTN, HTN, tx steroids and Methotrexate , transient history of transaminitis also in the past , currently not worse sob no chest pain able to exercise no rash no joint pain or swelling no new symptoms tolerating medications , tolerated Root canal well , taking Methotrexate 10 mg weekly and FA 1 mg daily and prednisone 5 mg    Sarcoidosis Dx 4/21- MRI: LVEF 21%, patchy mesocardiac/transmural/subepicardial LGE throughout suggestive of NICM including sarcoidosis. Started on tapering PDN. MTX added 12/23 and up titrated to 10 mg QW in January 2024 by Dr Trujillo   11/23 PED/CT  FDG  Mild uptake throughout the wall of the aorta may also represent atherosclerotic disease.  The Impression should state the following: Differential diagnosis includes hibernating myocardium. Myocardial FDG uptake is subtle, suggestive of very mild inflammation. Mild uptake throughout the wall of the aorta is suggestive of mild aortitis. Otherwise, no evidence of extracardiac sarcoidosis or other FDG-avid systemic inflammatory process.  The first line of the body of the report should state the following: Unchanged, faint uptake FDG uptake within the myocardium at the distal anteroseptal wall (axial images 20-24 on [facility] or 3:21 on [facility]) corresponding to an unchanged perfusion defect at a greater area of the distal anteroseptal wall in the sestamibi scan. -February 2024 Plan was to increase to 15mgQW + daily FA but he had transaminitis. Instructed to hold MTX x 2 weeks and restarted at10 mg QW and to c/w FA and liver function remains stable since then  -as LFT stable since then discussed to attempt to increase Methotrexate to 15 mg weekly again and monitor LFT closely, FA 1 mg daily , side affects discussed   -slow taper of prednisone by 1 mg per month, was started by Cardiology decrease from 5mg --> 4 mg   9/5/24 and cw monthly decrease by 1 mg if remains stable   -7/24 normal  ESR and , CRP , ACE,  2/24 Interleukin recept 2 ( CD25)  was normal Quantiferon normal and negative Hep B and C serologies   -long term steroid use-Bone health 1,25 [place] vit D normal. Screen pt for osteoporosis.  - Follow up in 4-6 weeks

## 2024-09-05 NOTE — HISTORY OF PRESENT ILLNESS
[___ Month(s) Ago] : [unfilled] month(s) ago [FreeTextEntry1] : 9/5/24 patient feels stable able to do root canal without complications, no sob no chest pain, cough, no recent infections no sob no chest pain no LE edema, remains stable  taking still Methotrexate 10 mg weekly and FA 1 mg daily, tolerates well and prednisone 5 mg daily no changes with medications no skin rash no joint pain no history of uveitis    7/25/24 no sob no chest , no rash, no uveitis, able to exercise regularly - slowed after Cardiac dx, no abd pain no dysphagia, no abd pain no blood in urine or stool  no recent infections,  planned for root canal was told has teeth infection and planned for root canal procedure

## 2024-10-17 ENCOUNTER — RX RENEWAL (OUTPATIENT)
Age: 69
End: 2024-10-17

## 2024-10-17 ENCOUNTER — NON-APPOINTMENT (OUTPATIENT)
Age: 69
End: 2024-10-17

## 2024-10-17 ENCOUNTER — APPOINTMENT (OUTPATIENT)
Dept: RHEUMATOLOGY | Facility: CLINIC | Age: 69
End: 2024-10-17
Payer: MEDICARE

## 2024-10-17 VITALS
BODY MASS INDEX: 25.58 KG/M2 | WEIGHT: 168.25 LBS | HEART RATE: 60 BPM | SYSTOLIC BLOOD PRESSURE: 118 MMHG | TEMPERATURE: 97.4 F | DIASTOLIC BLOOD PRESSURE: 64 MMHG

## 2024-10-17 DIAGNOSIS — I27.20 PULMONARY HYPERTENSION, UNSPECIFIED: ICD-10-CM

## 2024-10-17 DIAGNOSIS — I42.9 CARDIOMYOPATHY, UNSPECIFIED: ICD-10-CM

## 2024-10-17 PROCEDURE — 99215 OFFICE O/P EST HI 40 MIN: CPT

## 2024-10-17 PROCEDURE — G2211 COMPLEX E/M VISIT ADD ON: CPT

## 2024-10-17 PROCEDURE — 36415 COLL VENOUS BLD VENIPUNCTURE: CPT

## 2024-10-18 LAB
ALBUMIN SERPL ELPH-MCNC: 4.1 G/DL
ALP BLD-CCNC: 64 U/L
ALT SERPL-CCNC: 15 U/L
ANION GAP SERPL CALC-SCNC: 15 MMOL/L
AST SERPL-CCNC: 12 U/L
BILIRUB SERPL-MCNC: 0.7 MG/DL
BUN SERPL-MCNC: 20 MG/DL
CALCIUM SERPL-MCNC: 9.6 MG/DL
CHLORIDE SERPL-SCNC: 105 MMOL/L
CO2 SERPL-SCNC: 24 MMOL/L
CREAT SERPL-MCNC: 1.06 MG/DL
CRP SERPL-MCNC: <3 MG/L
EGFR: 75 ML/MIN/1.73M2
ERYTHROCYTE [SEDIMENTATION RATE] IN BLOOD BY WESTERGREN METHOD: 2 MM/HR
GLUCOSE SERPL-MCNC: 92 MG/DL
HCT VFR BLD CALC: 43.7 %
HGB BLD-MCNC: 13.9 G/DL
MCHC RBC-ENTMCNC: 29.7 PG
MCHC RBC-ENTMCNC: 31.8 GM/DL
MCV RBC AUTO: 93.4 FL
PLATELET # BLD AUTO: 345 K/UL
POTASSIUM SERPL-SCNC: 4.1 MMOL/L
PROT SERPL-MCNC: 6.6 G/DL
RBC # BLD: 4.68 M/UL
RBC # FLD: 14.1 %
SODIUM SERPL-SCNC: 144 MMOL/L
WBC # FLD AUTO: 5.04 K/UL

## 2024-10-20 LAB — IL2 SERPL-MCNC: 132.1 PG/ML

## 2024-10-21 LAB — ACE BLD-CCNC: 28 U/L

## 2024-10-28 NOTE — PATIENT PROFILE ADULT - NSPROPTRIGHTNOTIFY_GEN_A_NUR
Check a1c  Orders:    Hemoglobin A1C; Future    
Check lipid  Orders:    Comprehensive metabolic panel; Future    Lipid Panel with Direct LDL reflex; Future    
Check tsh  Orders:    TSH, 3rd generation with Free T4 reflex; Future    
declines

## 2024-11-25 ENCOUNTER — APPOINTMENT (OUTPATIENT)
Dept: HEART FAILURE | Facility: CLINIC | Age: 69
End: 2024-11-25
Payer: MEDICARE

## 2024-11-25 ENCOUNTER — APPOINTMENT (OUTPATIENT)
Dept: HEART AND VASCULAR | Facility: CLINIC | Age: 69
End: 2024-11-25
Payer: MEDICARE

## 2024-11-25 VITALS
OXYGEN SATURATION: 99 % | HEART RATE: 61 BPM | SYSTOLIC BLOOD PRESSURE: 98 MMHG | WEIGHT: 167 LBS | BODY MASS INDEX: 25.31 KG/M2 | HEIGHT: 68 IN | DIASTOLIC BLOOD PRESSURE: 61 MMHG

## 2024-11-25 PROCEDURE — 99214 OFFICE O/P EST MOD 30 MIN: CPT

## 2024-11-25 PROCEDURE — G2211 COMPLEX E/M VISIT ADD ON: CPT

## 2024-11-25 PROCEDURE — 93306 TTE W/DOPPLER COMPLETE: CPT

## 2024-11-26 ENCOUNTER — NON-APPOINTMENT (OUTPATIENT)
Age: 69
End: 2024-11-26

## 2024-12-19 ENCOUNTER — APPOINTMENT (OUTPATIENT)
Dept: RHEUMATOLOGY | Facility: CLINIC | Age: 69
End: 2024-12-19
Payer: MEDICARE

## 2024-12-19 VITALS
SYSTOLIC BLOOD PRESSURE: 94 MMHG | BODY MASS INDEX: 24.71 KG/M2 | TEMPERATURE: 96.6 F | WEIGHT: 163 LBS | HEIGHT: 68 IN | DIASTOLIC BLOOD PRESSURE: 56 MMHG

## 2024-12-19 DIAGNOSIS — D86.85 SARCOID MYOCARDITIS: ICD-10-CM

## 2024-12-19 PROCEDURE — 36415 COLL VENOUS BLD VENIPUNCTURE: CPT

## 2024-12-19 PROCEDURE — 99215 OFFICE O/P EST HI 40 MIN: CPT | Mod: 25

## 2024-12-20 LAB
ALBUMIN SERPL ELPH-MCNC: 4.4 G/DL
ALP BLD-CCNC: 71 U/L
ALT SERPL-CCNC: 9 U/L
ANION GAP SERPL CALC-SCNC: 12 MMOL/L
AST SERPL-CCNC: 15 U/L
BILIRUB SERPL-MCNC: 0.6 MG/DL
BUN SERPL-MCNC: 16 MG/DL
CALCIUM SERPL-MCNC: 9.7 MG/DL
CHLORIDE SERPL-SCNC: 106 MMOL/L
CHOLEST SERPL-MCNC: 160 MG/DL
CO2 SERPL-SCNC: 23 MMOL/L
CREAT SERPL-MCNC: 1.03 MG/DL
CRP SERPL-MCNC: <3 MG/L
EGFR: 78 ML/MIN/1.73M2
ERYTHROCYTE [SEDIMENTATION RATE] IN BLOOD BY WESTERGREN METHOD: 2 MM/HR
GLUCOSE SERPL-MCNC: 90 MG/DL
HCT VFR BLD CALC: 44.4 %
HDLC SERPL-MCNC: 63 MG/DL
HGB BLD-MCNC: 14.3 G/DL
LDLC SERPL CALC-MCNC: 84 MG/DL
MCHC RBC-ENTMCNC: 30.6 PG
MCHC RBC-ENTMCNC: 32.2 G/DL
MCV RBC AUTO: 94.9 FL
NONHDLC SERPL-MCNC: 97 MG/DL
PLATELET # BLD AUTO: 334 K/UL
POTASSIUM SERPL-SCNC: 4.4 MMOL/L
PROT SERPL-MCNC: 6.7 G/DL
RBC # BLD: 4.68 M/UL
RBC # FLD: 14.5 %
SODIUM SERPL-SCNC: 141 MMOL/L
TRIGL SERPL-MCNC: 67 MG/DL
WBC # FLD AUTO: 5.02 K/UL

## 2025-02-13 ENCOUNTER — RX RENEWAL (OUTPATIENT)
Age: 70
End: 2025-02-13

## 2025-02-20 ENCOUNTER — RX RENEWAL (OUTPATIENT)
Age: 70
End: 2025-02-20

## 2025-02-27 ENCOUNTER — NON-APPOINTMENT (OUTPATIENT)
Age: 70
End: 2025-02-27

## 2025-02-27 ENCOUNTER — APPOINTMENT (OUTPATIENT)
Dept: RHEUMATOLOGY | Facility: CLINIC | Age: 70
End: 2025-02-27
Payer: MEDICARE

## 2025-02-27 VITALS
OXYGEN SATURATION: 100 % | HEIGHT: 68 IN | BODY MASS INDEX: 25.31 KG/M2 | SYSTOLIC BLOOD PRESSURE: 105 MMHG | DIASTOLIC BLOOD PRESSURE: 61 MMHG | TEMPERATURE: 97.2 F | WEIGHT: 167 LBS | HEART RATE: 57 BPM

## 2025-02-27 DIAGNOSIS — D86.85 SARCOID MYOCARDITIS: ICD-10-CM

## 2025-02-27 DIAGNOSIS — I42.8 OTHER CARDIOMYOPATHIES: ICD-10-CM

## 2025-02-27 DIAGNOSIS — I27.20 PULMONARY HYPERTENSION, UNSPECIFIED: ICD-10-CM

## 2025-02-27 PROCEDURE — G2211 COMPLEX E/M VISIT ADD ON: CPT

## 2025-02-27 PROCEDURE — 36415 COLL VENOUS BLD VENIPUNCTURE: CPT

## 2025-02-27 PROCEDURE — 99215 OFFICE O/P EST HI 40 MIN: CPT

## 2025-02-28 LAB
ALBUMIN SERPL ELPH-MCNC: 4.3 G/DL
ALP BLD-CCNC: 80 U/L
ALT SERPL-CCNC: 12 U/L
ANION GAP SERPL CALC-SCNC: 14 MMOL/L
AST SERPL-CCNC: 15 U/L
BILIRUB SERPL-MCNC: 0.6 MG/DL
BUN SERPL-MCNC: 16 MG/DL
CALCIUM SERPL-MCNC: 9.8 MG/DL
CHLORIDE SERPL-SCNC: 104 MMOL/L
CO2 SERPL-SCNC: 24 MMOL/L
CREAT SERPL-MCNC: 0.98 MG/DL
CRP SERPL-MCNC: 4 MG/L
EGFR: 82 ML/MIN/1.73M2
ERYTHROCYTE [SEDIMENTATION RATE] IN BLOOD BY WESTERGREN METHOD: 6 MM/HR
GLUCOSE SERPL-MCNC: 80 MG/DL
HCT VFR BLD CALC: 45.3 %
HGB BLD-MCNC: 14 G/DL
MCHC RBC-ENTMCNC: 30.6 PG
MCHC RBC-ENTMCNC: 30.9 G/DL
MCV RBC AUTO: 98.9 FL
PLATELET # BLD AUTO: 383 K/UL
POTASSIUM SERPL-SCNC: 4.5 MMOL/L
PROT SERPL-MCNC: 6.8 G/DL
RBC # BLD: 4.58 M/UL
RBC # FLD: 14.2 %
SODIUM SERPL-SCNC: 142 MMOL/L
WBC # FLD AUTO: 4.11 K/UL

## 2025-03-01 LAB
C3 SERPL-MCNC: 118 MG/DL
C4 SERPL-MCNC: 38 MG/DL

## 2025-03-03 LAB
ACE BLD-CCNC: 43 U/L
ACE BLD-CCNC: 45 U/L
IL2 SERPL-MCNC: 356.4 PG/ML

## 2025-03-07 LAB — CHITOTRIOSIDASE SERPL-CCNC: 93 NMOLES/HR/ML

## 2025-03-26 ENCOUNTER — RX RENEWAL (OUTPATIENT)
Age: 70
End: 2025-03-26

## 2025-03-26 RX ORDER — DAPAGLIFLOZIN 10 MG/1
10 TABLET, FILM COATED ORAL
Qty: 90 | Refills: 3 | Status: ACTIVE | COMMUNITY
Start: 2025-03-26 | End: 1900-01-01

## 2025-04-11 ENCOUNTER — RESULT REVIEW (OUTPATIENT)
Age: 70
End: 2025-04-11

## 2025-04-11 ENCOUNTER — OUTPATIENT (OUTPATIENT)
Dept: OUTPATIENT SERVICES | Facility: HOSPITAL | Age: 70
LOS: 1 days | End: 2025-04-11
Payer: COMMERCIAL

## 2025-04-11 PROCEDURE — 72190 X-RAY EXAM OF PELVIS: CPT | Mod: 26

## 2025-04-11 PROCEDURE — 72040 X-RAY EXAM NECK SPINE 2-3 VW: CPT | Mod: 26

## 2025-04-11 PROCEDURE — 72040 X-RAY EXAM NECK SPINE 2-3 VW: CPT

## 2025-04-11 PROCEDURE — 72100 X-RAY EXAM L-S SPINE 2/3 VWS: CPT

## 2025-04-11 PROCEDURE — 72100 X-RAY EXAM L-S SPINE 2/3 VWS: CPT | Mod: 26

## 2025-04-11 PROCEDURE — 72190 X-RAY EXAM OF PELVIS: CPT

## 2025-04-11 PROCEDURE — 93971 EXTREMITY STUDY: CPT

## 2025-04-11 PROCEDURE — 93971 EXTREMITY STUDY: CPT | Mod: 26,LT

## 2025-05-13 ENCOUNTER — APPOINTMENT (OUTPATIENT)
Dept: HEART FAILURE | Facility: CLINIC | Age: 70
End: 2025-05-13
Payer: MEDICARE

## 2025-05-13 ENCOUNTER — NON-APPOINTMENT (OUTPATIENT)
Age: 70
End: 2025-05-13

## 2025-05-13 VITALS
WEIGHT: 168 LBS | TEMPERATURE: 97.8 F | OXYGEN SATURATION: 97 % | SYSTOLIC BLOOD PRESSURE: 99 MMHG | HEART RATE: 59 BPM | BODY MASS INDEX: 25.46 KG/M2 | HEIGHT: 68 IN | DIASTOLIC BLOOD PRESSURE: 58 MMHG

## 2025-05-13 PROCEDURE — 99214 OFFICE O/P EST MOD 30 MIN: CPT

## 2025-05-29 ENCOUNTER — APPOINTMENT (OUTPATIENT)
Dept: RHEUMATOLOGY | Facility: CLINIC | Age: 70
End: 2025-05-29
Payer: MEDICARE

## 2025-05-29 ENCOUNTER — OUTPATIENT (OUTPATIENT)
Dept: OUTPATIENT SERVICES | Facility: HOSPITAL | Age: 70
LOS: 1 days | End: 2025-05-29
Payer: COMMERCIAL

## 2025-05-29 VITALS
HEIGHT: 68 IN | WEIGHT: 165 LBS | TEMPERATURE: 97.3 F | SYSTOLIC BLOOD PRESSURE: 104 MMHG | OXYGEN SATURATION: 99 % | HEART RATE: 61 BPM | BODY MASS INDEX: 25.01 KG/M2 | DIASTOLIC BLOOD PRESSURE: 58 MMHG

## 2025-05-29 DIAGNOSIS — D86.85 SARCOID MYOCARDITIS: ICD-10-CM

## 2025-05-29 DIAGNOSIS — M25.50 PAIN IN UNSPECIFIED JOINT: ICD-10-CM

## 2025-05-29 PROCEDURE — 73130 X-RAY EXAM OF HAND: CPT | Mod: 26,50

## 2025-05-29 PROCEDURE — 73130 X-RAY EXAM OF HAND: CPT

## 2025-05-29 PROCEDURE — 99215 OFFICE O/P EST HI 40 MIN: CPT | Mod: 25

## 2025-05-29 PROCEDURE — 36415 COLL VENOUS BLD VENIPUNCTURE: CPT

## 2025-05-29 RX ORDER — METHYLPREDNISOLONE 4 MG/1
4 TABLET ORAL
Qty: 1 | Refills: 1 | Status: ACTIVE | COMMUNITY
Start: 2025-05-29 | End: 1900-01-01

## 2025-05-30 LAB
ALBUMIN SERPL ELPH-MCNC: 4.4 G/DL
ALP BLD-CCNC: 102 U/L
ALT SERPL-CCNC: 20 U/L
ANION GAP SERPL CALC-SCNC: 14 MMOL/L
AST SERPL-CCNC: 19 U/L
BILIRUB SERPL-MCNC: 0.6 MG/DL
BUN SERPL-MCNC: 26 MG/DL
CALCIUM SERPL-MCNC: 10 MG/DL
CHLORIDE SERPL-SCNC: 104 MMOL/L
CK SERPL-CCNC: 123 U/L
CO2 SERPL-SCNC: 21 MMOL/L
CREAT SERPL-MCNC: 1.22 MG/DL
CRP SERPL-MCNC: 4 MG/L
EGFRCR SERPLBLD CKD-EPI 2021: 63 ML/MIN/1.73M2
ERYTHROCYTE [SEDIMENTATION RATE] IN BLOOD BY WESTERGREN METHOD: 5 MM/HR
GLUCOSE SERPL-MCNC: 83 MG/DL
HCT VFR BLD CALC: 45.7 %
HGB BLD-MCNC: 14.3 G/DL
MCHC RBC-ENTMCNC: 29.7 PG
MCHC RBC-ENTMCNC: 31.3 G/DL
MCV RBC AUTO: 94.8 FL
PLATELET # BLD AUTO: 387 K/UL
POTASSIUM SERPL-SCNC: 4.8 MMOL/L
PROT SERPL-MCNC: 6.9 G/DL
RBC # BLD: 4.82 M/UL
RBC # FLD: 13.5 %
SODIUM SERPL-SCNC: 139 MMOL/L
URATE SERPL-MCNC: 5.4 MG/DL
WBC # FLD AUTO: 4.68 K/UL

## 2025-06-01 LAB
ALBUMIN MFR SERPL ELPH: 59.8 %
ALBUMIN SERPL-MCNC: 4.2 G/DL
ALBUMIN/GLOB SERPL: 1.4 RATIO
ALPHA1 GLOB MFR SERPL ELPH: 4.7 %
ALPHA1 GLOB SERPL ELPH-MCNC: 0.3 G/DL
ALPHA2 GLOB MFR SERPL ELPH: 9.5 %
ALPHA2 GLOB SERPL ELPH-MCNC: 0.7 G/DL
B-GLOBULIN MFR SERPL ELPH: 11 %
B-GLOBULIN SERPL ELPH-MCNC: 0.8 G/DL
GAMMA GLOB FLD ELPH-MCNC: 1.1 G/DL
GAMMA GLOB MFR SERPL ELPH: 15 %
IL2 SERPL-MCNC: 333.5 PG/ML
INTERPRETATION SERPL IEP-IMP: NORMAL
M PROTEIN SPEC IFE-MCNC: NORMAL
PROT SERPL-MCNC: 7.1 G/DL
PROT SERPL-MCNC: 7.1 G/DL

## 2025-06-02 LAB — ACE BLD-CCNC: 31 U/L

## 2025-06-12 ENCOUNTER — APPOINTMENT (OUTPATIENT)
Dept: RHEUMATOLOGY | Facility: CLINIC | Age: 70
End: 2025-06-12

## 2025-06-12 VITALS
SYSTOLIC BLOOD PRESSURE: 97 MMHG | BODY MASS INDEX: 24.71 KG/M2 | HEART RATE: 63 BPM | TEMPERATURE: 97.3 F | WEIGHT: 163 LBS | DIASTOLIC BLOOD PRESSURE: 55 MMHG | HEIGHT: 68 IN | OXYGEN SATURATION: 97 %

## 2025-06-12 PROCEDURE — 99215 OFFICE O/P EST HI 40 MIN: CPT

## 2025-06-12 PROCEDURE — G2211 COMPLEX E/M VISIT ADD ON: CPT

## 2025-06-12 RX ORDER — PREDNISONE 5 MG/1
5 TABLET ORAL DAILY
Qty: 30 | Refills: 1 | Status: ACTIVE | COMMUNITY
Start: 2025-06-12 | End: 1900-01-01

## 2025-07-31 ENCOUNTER — APPOINTMENT (OUTPATIENT)
Dept: RHEUMATOLOGY | Facility: CLINIC | Age: 70
End: 2025-07-31
Payer: MEDICARE

## 2025-07-31 VITALS
BODY MASS INDEX: 24.86 KG/M2 | OXYGEN SATURATION: 99 % | TEMPERATURE: 97.3 F | HEIGHT: 68 IN | HEART RATE: 55 BPM | WEIGHT: 164 LBS | DIASTOLIC BLOOD PRESSURE: 60 MMHG | SYSTOLIC BLOOD PRESSURE: 100 MMHG

## 2025-07-31 DIAGNOSIS — I42.9 CARDIOMYOPATHY, UNSPECIFIED: ICD-10-CM

## 2025-07-31 PROCEDURE — G2211 COMPLEX E/M VISIT ADD ON: CPT

## 2025-07-31 PROCEDURE — 99215 OFFICE O/P EST HI 40 MIN: CPT

## 2025-07-31 PROCEDURE — 36415 COLL VENOUS BLD VENIPUNCTURE: CPT

## 2025-08-01 LAB
ALBUMIN SERPL ELPH-MCNC: 4.4 G/DL
ALP BLD-CCNC: 90 U/L
ALT SERPL-CCNC: 16 U/L
ANION GAP SERPL CALC-SCNC: 18 MMOL/L
AST SERPL-CCNC: 22 U/L
BILIRUB SERPL-MCNC: 0.9 MG/DL
BUN SERPL-MCNC: 17 MG/DL
CALCIUM SERPL-MCNC: 9.9 MG/DL
CHLORIDE SERPL-SCNC: 108 MMOL/L
CHOLEST SERPL-MCNC: 169 MG/DL
CO2 SERPL-SCNC: 21 MMOL/L
CREAT SERPL-MCNC: 0.97 MG/DL
CRP SERPL-MCNC: 4 MG/L
EGFRCR SERPLBLD CKD-EPI 2021: 83 ML/MIN/1.73M2
ERYTHROCYTE [SEDIMENTATION RATE] IN BLOOD BY WESTERGREN METHOD: 6 MM/HR
GLUCOSE SERPL-MCNC: 87 MG/DL
HCT VFR BLD CALC: 47.4 %
HDLC SERPL-MCNC: 72 MG/DL
HGB BLD-MCNC: 14.5 G/DL
LDLC SERPL-MCNC: 86 MG/DL
MCHC RBC-ENTMCNC: 30 PG
MCHC RBC-ENTMCNC: 30.6 G/DL
MCV RBC AUTO: 98.1 FL
NONHDLC SERPL-MCNC: 98 MG/DL
PLATELET # BLD AUTO: 359 K/UL
POTASSIUM SERPL-SCNC: 4.9 MMOL/L
PROT SERPL-MCNC: 7.2 G/DL
RBC # BLD: 4.83 M/UL
RBC # FLD: 15.4 %
SODIUM SERPL-SCNC: 146 MMOL/L
TRIGL SERPL-MCNC: 59 MG/DL
WBC # FLD AUTO: 3.99 K/UL

## 2025-08-03 LAB — IL2 SERPL-MCNC: 316.8 PG/ML

## 2025-08-04 LAB — ACE BLD-CCNC: 30 U/L

## 2025-08-07 LAB — CHITOTRIOSIDASE SERPL-CCNC: 71 NMOLES/HR/ML

## 2025-09-18 ENCOUNTER — APPOINTMENT (OUTPATIENT)
Dept: RHEUMATOLOGY | Facility: CLINIC | Age: 70
End: 2025-09-18
Payer: MEDICARE

## 2025-09-18 VITALS
SYSTOLIC BLOOD PRESSURE: 129 MMHG | WEIGHT: 165 LBS | TEMPERATURE: 97.1 F | DIASTOLIC BLOOD PRESSURE: 75 MMHG | HEART RATE: 60 BPM | HEIGHT: 68 IN | BODY MASS INDEX: 25.01 KG/M2 | OXYGEN SATURATION: 100 %

## 2025-09-18 DIAGNOSIS — I42.9 CARDIOMYOPATHY, UNSPECIFIED: ICD-10-CM

## 2025-09-18 DIAGNOSIS — D86.85 SARCOID MYOCARDITIS: ICD-10-CM

## 2025-09-18 PROCEDURE — 99215 OFFICE O/P EST HI 40 MIN: CPT | Mod: 25

## 2025-09-18 PROCEDURE — 36415 COLL VENOUS BLD VENIPUNCTURE: CPT

## 2025-09-22 LAB
ALBUMIN SERPL ELPH-MCNC: 4.1 G/DL
ALP BLD-CCNC: 82 U/L
ALT SERPL-CCNC: 20 U/L
ANION GAP SERPL CALC-SCNC: 12 MMOL/L
AST SERPL-CCNC: 20 U/L
BILIRUB SERPL-MCNC: 0.7 MG/DL
BUN SERPL-MCNC: 14 MG/DL
CALCIUM SERPL-MCNC: 9.3 MG/DL
CHLORIDE SERPL-SCNC: 104 MMOL/L
CO2 SERPL-SCNC: 24 MMOL/L
CREAT SERPL-MCNC: 1.1 MG/DL
CRP SERPL-MCNC: 3 MG/L
EGFRCR SERPLBLD CKD-EPI 2021: 71 ML/MIN/1.73M2
ERYTHROCYTE [SEDIMENTATION RATE] IN BLOOD BY WESTERGREN METHOD: 2 MM/HR
GLUCOSE SERPL-MCNC: 82 MG/DL
HCT VFR BLD CALC: 44.5 %
HGB BLD-MCNC: 13.8 G/DL
MCHC RBC-ENTMCNC: 29.6 PG
MCHC RBC-ENTMCNC: 31 G/DL
MCV RBC AUTO: 95.5 FL
PLATELET # BLD AUTO: 332 K/UL
POTASSIUM SERPL-SCNC: 4.7 MMOL/L
PROT SERPL-MCNC: 6.8 G/DL
RBC # BLD: 4.66 M/UL
RBC # FLD: 14.8 %
SODIUM SERPL-SCNC: 141 MMOL/L
WBC # FLD AUTO: 3.97 K/UL